# Patient Record
Sex: FEMALE | Race: WHITE
[De-identification: names, ages, dates, MRNs, and addresses within clinical notes are randomized per-mention and may not be internally consistent; named-entity substitution may affect disease eponyms.]

---

## 2018-02-04 ENCOUNTER — HOSPITAL ENCOUNTER (EMERGENCY)
Dept: HOSPITAL 63 - ER | Age: 68
Discharge: HOME | End: 2018-02-04
Payer: MEDICARE

## 2018-02-04 VITALS — WEIGHT: 190.26 LBS | HEIGHT: 63 IN | BODY MASS INDEX: 33.71 KG/M2

## 2018-02-04 VITALS — DIASTOLIC BLOOD PRESSURE: 85 MMHG | SYSTOLIC BLOOD PRESSURE: 149 MMHG

## 2018-02-04 DIAGNOSIS — I11.0: ICD-10-CM

## 2018-02-04 DIAGNOSIS — E11.9: ICD-10-CM

## 2018-02-04 DIAGNOSIS — Z88.8: ICD-10-CM

## 2018-02-04 DIAGNOSIS — I50.9: ICD-10-CM

## 2018-02-04 DIAGNOSIS — E78.00: ICD-10-CM

## 2018-02-04 DIAGNOSIS — E03.9: ICD-10-CM

## 2018-02-04 DIAGNOSIS — I49.9: Primary | ICD-10-CM

## 2018-02-04 LAB
ALBUMIN SERPL-MCNC: 3.7 G/DL (ref 3.4–5)
ALBUMIN/GLOB SERPL: 1.1 {RATIO} (ref 1–1.7)
ALP SERPL-CCNC: 93 U/L (ref 46–116)
ALT SERPL-CCNC: 21 U/L (ref 14–59)
ANION GAP SERPL CALC-SCNC: 11 MMOL/L (ref 6–14)
AST SERPL-CCNC: 18 U/L (ref 15–37)
BASOPHILS # BLD AUTO: 0 X10^3/UL (ref 0–0.2)
BASOPHILS NFR BLD: 1 % (ref 0–3)
BILIRUB SERPL-MCNC: 0.6 MG/DL (ref 0.2–1)
BUN/CREAT SERPL: 18 (ref 6–20)
CA-I SERPL ISE-MCNC: 18 MG/DL (ref 7–20)
CALCIUM SERPL-MCNC: 8.8 MG/DL (ref 8.5–10.1)
CHLORIDE SERPL-SCNC: 103 MMOL/L (ref 98–107)
CK SERPL-CCNC: 81 U/L (ref 26–192)
CO2 SERPL-SCNC: 25 MMOL/L (ref 21–32)
CREAT SERPL-MCNC: 1 MG/DL (ref 0.6–1)
EOSINOPHIL NFR BLD: 0.2 X10^3/UL (ref 0–0.7)
EOSINOPHIL NFR BLD: 3 % (ref 0–3)
ERYTHROCYTE [DISTWIDTH] IN BLOOD BY AUTOMATED COUNT: 13.6 % (ref 11.5–14.5)
GFR SERPLBLD BASED ON 1.73 SQ M-ARVRAT: 55.5 ML/MIN
GLOBULIN SER-MCNC: 3.5 G/DL (ref 2.2–3.8)
GLUCOSE SERPL-MCNC: 156 MG/DL (ref 70–99)
HCT VFR BLD CALC: 40.6 % (ref 36–47)
HGB BLD-MCNC: 14.1 G/DL (ref 12–15.5)
LYMPHOCYTES # BLD: 1.6 X10^3/UL (ref 1–4.8)
LYMPHOCYTES NFR BLD AUTO: 28 % (ref 24–48)
MAGNESIUM SERPL-MCNC: 2 MG/DL (ref 1.8–2.4)
MCH RBC QN AUTO: 31 PG (ref 25–35)
MCHC RBC AUTO-ENTMCNC: 35 G/DL (ref 31–37)
MCV RBC AUTO: 89 FL (ref 79–100)
MONO #: 0.6 X10^3/UL (ref 0–1.1)
MONOCYTES NFR BLD: 10 % (ref 0–9)
NEUT #: 3.4 X10^3UL (ref 1.8–7.7)
NEUTROPHILS NFR BLD AUTO: 58 % (ref 31–73)
PLATELET # BLD AUTO: 231 X10^3/UL (ref 140–400)
POTASSIUM SERPL-SCNC: 4 MMOL/L (ref 3.5–5.1)
PROT SERPL-MCNC: 7.2 G/DL (ref 6.4–8.2)
RBC # BLD AUTO: 4.57 X10^6/UL (ref 3.5–5.4)
SODIUM SERPL-SCNC: 139 MMOL/L (ref 136–145)
WBC # BLD AUTO: 5.8 X10^3/UL (ref 4–11)

## 2018-02-04 PROCEDURE — 93005 ELECTROCARDIOGRAM TRACING: CPT

## 2018-02-04 PROCEDURE — 83880 ASSAY OF NATRIURETIC PEPTIDE: CPT

## 2018-02-04 PROCEDURE — 84443 ASSAY THYROID STIM HORMONE: CPT

## 2018-02-04 PROCEDURE — 83735 ASSAY OF MAGNESIUM: CPT

## 2018-02-04 PROCEDURE — 85025 COMPLETE CBC W/AUTO DIFF WBC: CPT

## 2018-02-04 PROCEDURE — 80053 COMPREHEN METABOLIC PANEL: CPT

## 2018-02-04 PROCEDURE — 84484 ASSAY OF TROPONIN QUANT: CPT

## 2018-02-04 PROCEDURE — 71046 X-RAY EXAM CHEST 2 VIEWS: CPT

## 2018-02-04 PROCEDURE — 82553 CREATINE MB FRACTION: CPT

## 2018-02-04 PROCEDURE — 85610 PROTHROMBIN TIME: CPT

## 2018-02-04 PROCEDURE — 83605 ASSAY OF LACTIC ACID: CPT

## 2018-02-04 PROCEDURE — 36415 COLL VENOUS BLD VENIPUNCTURE: CPT

## 2018-02-04 NOTE — EKG
[facility] [address], [place] [address]

Test Date:    2018               Test Time:    06:31:18

Pat Name:     JAVIER SMITH          Department:   

Patient ID:   [id number]           Room:          

Gender:       F                        Technician:   JENIFFER

:          1950               Requested By: DANITZA CAROLINA

Order Number: [id number]            Reading MD:     

                                 Measurements

Intervals                              Axis          

Rate:         64                       P:            89

AK:           200                      QRS:          9

QRSD:         74                       T:            86

QT:           394                                    

QTc:          410                                    

                           Interpretive Statements

SINUS RHYTHM

VENTRICULAR PREMATURE COMPLEX(ES)

ST & T ABNORMALITY, CONSIDER

HIGH LATERAL ISCHEMIA OR LEFT VENTRICULAR STRAIN

ABNORMAL ECG

RI6.01

No previous ECG available for comparison

## 2018-02-04 NOTE — PHYS DOC
Past History


Past Medical History:  Arthritis, Diabetes, High Cholesterol, Hypertension, 

Hypothyroid


Past Surgical History:  No Surgical History


Alcohol Use:  None


Drug Use:  None





Adult General


Chief Complaint


Chief Complaint:  my heart stopped





[facility]


67-year-old female patient with history of diabetes, dyslipidemia, hypertension 

and hypothyroidism without cardiac problem states she woke up with unusual 

feeling in her heart and felt looked like her heart was stopped. Patient 

complaining of not feeling right at that time without weakness, chest pain, 

shortness of breath, palpitation. Patient states the episodes last about 10 

minutes and then she felt heart her heart beat for a short time. Patient states 

she had 2 other episodes of the same problem during the last 1 month without 

seeking medical attention.





Review of Systems


Review of Systems





Constitutional: Denies fever or chills []


Eyes: Denies change in visual acuity, redness, or eye pain []


HENT: Denies nasal congestion or sore throat []


Respiratory: Denies cough or shortness of breath []


Cardiovascular: No additional information not addressed in HPI []


GI: Denies abdominal pain, nausea, vomiting, bloody stools or diarrhea []


: Denies dysuria or hematuria []


Musculoskeletal: Denies back pain or joint pain []


Integument: Denies rash or skin lesions []


Neurologic: Denies headache, focal weakness or sensory changes []


Endocrine: Denies polyuria or polydipsia []





All other systems were reviewed and found to be within normal limits, except as 

documented in this note.





Allergies


Allergies





Allergies








Coded Allergies Type Severity Reaction Last Updated Verified


 


  lisinopril Allergy Intermediate  2/4/18 No











Physical Exam


Physical Exam





Constitutional: Well developed, well nourished, no acute distress, non-toxic 

appearance. []


HENT: Normocephalic, atraumatic, bilateral external ears normal, oropharynx 

moist, no oral exudates, nose normal. []


Eyes: PERRLA, EOMI, conjunctiva normal, no discharge. [] 


Neck: Normal range of motion, no tenderness, supple, no stridor. [] 


Cardiovascular:Heart rate regular rhythm, no murmur []


Lungs & Thorax:  Bilateral breath sounds clear to auscultation []


Abdomen: Bowel sounds normal, soft, no tenderness, no masses, no pulsatile 

masses. [] 


Skin: Warm, dry, no erythema, no rash. [] 


Back: No tenderness, no CVA tenderness. [] 


Extremities: No tenderness, no cyanosis, no clubbing, ROM intact, no edema. [] 


Neurologic: Alert and oriented X 3, normal motor function, normal sensory 

function, no focal deficits noted. []


Psychologic: Affect normal, judgement normal, mood normal. []





Current Patient Data


Vital Signs





 Vital Signs








  Date Time  Temp Pulse Resp B/P (MAP) Pulse Ox O2 Delivery O2 Flow Rate FiO2


 


2/4/18 06:30 97.6 55 22 176/93 (120) 96 Room Air  








Lab Results





 Laboratory Tests








Test


  2/4/18


06:35


 


White Blood Count


  5.8 x10^3/uL


(4.0-11.0)


 


Red Blood Count


  4.57 x10^6/uL


(3.50-5.40)


 


Hemoglobin


  14.1 g/dL


(12.0-15.5)


 


Hematocrit


  40.6 %


(36.0-47.0)


 


Mean Corpuscular Volume


  89 fL ()


 


 


Mean Corpuscular Hemoglobin 31 pg (25-35)  


 


Mean Corpuscular Hemoglobin


Concent 35 g/dL


(31-37)


 


Red Cell Distribution Width


  13.6 %


(11.5-14.5)


 


Platelet Count


  231 x10^3/uL


(140-400)


 


Neutrophils (%) (Auto) 58 % (31-73)  


 


Lymphocytes (%) (Auto) 28 % (24-48)  


 


Monocytes (%) (Auto) 10 % (0-9)  H


 


Eosinophils (%) (Auto) 3 % (0-3)  


 


Basophils (%) (Auto) 1 % (0-3)  


 


Neutrophils # (Auto)


  3.4 x10^3uL


(1.8-7.7)


 


Lymphocytes # (Auto)


  1.6 x10^3/uL


(1.0-4.8)


 


Monocytes # (Auto)


  0.6 x10^3/uL


(0.0-1.1)


 


Eosinophils # (Auto)


  0.2 x10^3/uL


(0.0-0.7)


 


Basophils # (Auto)


  0.0 x10^3/uL


(0.0-0.2)











EKG


EKG


EKG interpreted by me. EKG at 0 631 showed sinus rhythm with frequent PVCs, ST 

and T wave abnormality in lateral leads, no acute ST and T wave abnormality.





Radiology/Procedures


Radiology/Procedures


[Two-view chest x-ray interpreted by me did not show acute finding except for 

mild cardiomegaly]





Course & Med Decision Making


Course & Med Decision Making


Pertinent Labs and Imaging studies reviewed. (See chart for details)


Evaluation of patient in ER showed 67-year-old female patient with several 

episodes of unusual feeling in her cough for the last 1 month. Patient had one 

episodes of feeling of her heart was stopped this morning that last about 10 

minutes without loss of consciousness. Patient had frequent PVCs at arrival to 

ER that gradually resolved. Had unremarkable EKG and labs except for mild 

elevation of BNP. For patient admission for observation and continued on 

cardiac monitor. Patient refuses admission. Patient instructed to follow up 

with her primary care physician for possible Holter monitor. 


discharge:





I've spoken with the patient and/or caregivers. I've explained the patient's 

condition, diagnosis and treatment plan based on information available to me at 

this time. I've answered the patient's and/or caregivers questions and 

addressed any concerns. The patient and/or caregivers have a good understanding 

the patient's diagnosis, condition and treatment plan as can be expected at 

this point. Vital signs have been stabilized. The patient's condition is stable 

for discharge from the emergency department.





The patient will pursue further outpatient evaluation with her primary care 

provider or other designated consulting physician as outlined in the discharge 

instructions. Patient and/or caregivers are agreeable to this plan of care and 

follow-up instructions have been explained in detail. The patient and/or 

caregivers have received these instructions in written format and expressed 

understanding of these discharge instructions. The patient and her caregivers 

are aware that if any significant change in condition or worsening of symptoms 

should prompt him to immediately return to this of the closest emergency 

department.  If an emergent department is not readily available I would 

encourage him to call 911.





Dragon Disclaimer


Dragon Disclaimer


This electronic medical record was generated, in whole or in part, using a 

voice recognition dictation system.





Departure


Departure:


Impression:  


 Primary Impression:  


 Arrhythmia


 Additional Impression:  


 Heart failure


Disposition:  01 HOME, SELF-CARE (At 0834)


Condition:  IMPROVED


Referrals:  


BRITTANY WONG MD (PCP)


Patient Instructions:  Cardiac Arrhythmia





Additional Instructions:  


Follow-up with your primary care physician in one or 2 days for possible Holter 

cardiac monitor


Return if not getting better





Problem Qualifiers











DANITZA CAROLINA MD Feb 4, 2018 07:44

## 2018-02-04 NOTE — RAD
PROCEDURE: CHEST PA   LATERAL



CLINICAL INDICATION: irregular heartbeat



COMPARISON: 2/1/2016



FINDINGS:  

No pneumothorax identified. Cardiac and mediastinal contours unremarkable. No

pulmonary consolidation or acute airspace disease. No acute osseous

abnormalities identified. 



IMPRESSION:

No pulmonary consolidation or acute airspace disease.

## 2019-10-06 ENCOUNTER — HOSPITAL ENCOUNTER (OUTPATIENT)
Dept: HOSPITAL 63 - ER | Age: 69
Setting detail: OBSERVATION
Discharge: ANOTHER HOSPITAL | End: 2019-10-06
Attending: FAMILY MEDICINE | Admitting: FAMILY MEDICINE
Payer: MEDICARE

## 2019-10-06 ENCOUNTER — HOSPITAL ENCOUNTER (INPATIENT)
Dept: HOSPITAL 61 - 1 WEST ICU | Age: 69
LOS: 2 days | Discharge: HOME | DRG: 247 | End: 2019-10-08
Attending: INTERNAL MEDICINE | Admitting: INTERNAL MEDICINE
Payer: MEDICARE

## 2019-10-06 VITALS — DIASTOLIC BLOOD PRESSURE: 59 MMHG | SYSTOLIC BLOOD PRESSURE: 105 MMHG

## 2019-10-06 VITALS — DIASTOLIC BLOOD PRESSURE: 61 MMHG | SYSTOLIC BLOOD PRESSURE: 169 MMHG

## 2019-10-06 VITALS — HEIGHT: 63 IN | WEIGHT: 186.19 LBS | BODY MASS INDEX: 32.99 KG/M2

## 2019-10-06 VITALS — SYSTOLIC BLOOD PRESSURE: 153 MMHG | DIASTOLIC BLOOD PRESSURE: 67 MMHG

## 2019-10-06 VITALS — SYSTOLIC BLOOD PRESSURE: 159 MMHG | DIASTOLIC BLOOD PRESSURE: 72 MMHG

## 2019-10-06 VITALS — WEIGHT: 189.56 LBS | HEIGHT: 65 IN | BODY MASS INDEX: 31.58 KG/M2

## 2019-10-06 VITALS — DIASTOLIC BLOOD PRESSURE: 54 MMHG | SYSTOLIC BLOOD PRESSURE: 122 MMHG

## 2019-10-06 VITALS — SYSTOLIC BLOOD PRESSURE: 157 MMHG | DIASTOLIC BLOOD PRESSURE: 76 MMHG

## 2019-10-06 VITALS
DIASTOLIC BLOOD PRESSURE: 56 MMHG | SYSTOLIC BLOOD PRESSURE: 168 MMHG | SYSTOLIC BLOOD PRESSURE: 168 MMHG | DIASTOLIC BLOOD PRESSURE: 56 MMHG

## 2019-10-06 VITALS — DIASTOLIC BLOOD PRESSURE: 63 MMHG | SYSTOLIC BLOOD PRESSURE: 144 MMHG

## 2019-10-06 VITALS — DIASTOLIC BLOOD PRESSURE: 50 MMHG | SYSTOLIC BLOOD PRESSURE: 104 MMHG

## 2019-10-06 VITALS — SYSTOLIC BLOOD PRESSURE: 112 MMHG | DIASTOLIC BLOOD PRESSURE: 62 MMHG

## 2019-10-06 VITALS — SYSTOLIC BLOOD PRESSURE: 157 MMHG | DIASTOLIC BLOOD PRESSURE: 67 MMHG

## 2019-10-06 VITALS — DIASTOLIC BLOOD PRESSURE: 70 MMHG | SYSTOLIC BLOOD PRESSURE: 138 MMHG

## 2019-10-06 VITALS — SYSTOLIC BLOOD PRESSURE: 165 MMHG | DIASTOLIC BLOOD PRESSURE: 74 MMHG

## 2019-10-06 VITALS — DIASTOLIC BLOOD PRESSURE: 75 MMHG | SYSTOLIC BLOOD PRESSURE: 159 MMHG

## 2019-10-06 VITALS — DIASTOLIC BLOOD PRESSURE: 70 MMHG | SYSTOLIC BLOOD PRESSURE: 147 MMHG

## 2019-10-06 VITALS — DIASTOLIC BLOOD PRESSURE: 69 MMHG | SYSTOLIC BLOOD PRESSURE: 152 MMHG

## 2019-10-06 DIAGNOSIS — M19.90: ICD-10-CM

## 2019-10-06 DIAGNOSIS — R07.2: Primary | ICD-10-CM

## 2019-10-06 DIAGNOSIS — E66.9: ICD-10-CM

## 2019-10-06 DIAGNOSIS — E03.9: ICD-10-CM

## 2019-10-06 DIAGNOSIS — I21.4: Primary | ICD-10-CM

## 2019-10-06 DIAGNOSIS — Z82.49: ICD-10-CM

## 2019-10-06 DIAGNOSIS — N18.3: ICD-10-CM

## 2019-10-06 DIAGNOSIS — Z88.8: ICD-10-CM

## 2019-10-06 DIAGNOSIS — I12.9: ICD-10-CM

## 2019-10-06 DIAGNOSIS — I10: ICD-10-CM

## 2019-10-06 DIAGNOSIS — Z98.61: ICD-10-CM

## 2019-10-06 DIAGNOSIS — R79.89: ICD-10-CM

## 2019-10-06 DIAGNOSIS — E11.22: ICD-10-CM

## 2019-10-06 DIAGNOSIS — E78.5: ICD-10-CM

## 2019-10-06 DIAGNOSIS — E11.9: ICD-10-CM

## 2019-10-06 DIAGNOSIS — Z83.3: ICD-10-CM

## 2019-10-06 LAB
ALBUMIN SERPL-MCNC: 3.5 G/DL (ref 3.4–5)
ALBUMIN/GLOB SERPL: 0.9 {RATIO} (ref 1–1.7)
ALP SERPL-CCNC: 87 U/L (ref 46–116)
ALT SERPL-CCNC: 16 U/L (ref 14–59)
ANION GAP SERPL CALC-SCNC: 12 MMOL/L (ref 6–14)
AST SERPL-CCNC: 17 U/L (ref 15–37)
BASOPHILS # BLD AUTO: 0 X10^3/UL (ref 0–0.2)
BASOPHILS NFR BLD: 1 % (ref 0–3)
BILIRUB SERPL-MCNC: 0.4 MG/DL (ref 0.2–1)
BUN/CREAT SERPL: 19 (ref 6–20)
CA-I SERPL ISE-MCNC: 21 MG/DL (ref 7–20)
CALCIUM SERPL-MCNC: 8.7 MG/DL (ref 8.5–10.1)
CHLORIDE SERPL-SCNC: 100 MMOL/L (ref 98–107)
CO2 SERPL-SCNC: 26 MMOL/L (ref 21–32)
CREAT SERPL-MCNC: 1.1 MG/DL (ref 0.6–1)
EOSINOPHIL NFR BLD: 0.2 X10^3/UL (ref 0–0.7)
EOSINOPHIL NFR BLD: 2 % (ref 0–3)
ERYTHROCYTE [DISTWIDTH] IN BLOOD BY AUTOMATED COUNT: 13.9 % (ref 11.5–14.5)
GFR SERPLBLD BASED ON 1.73 SQ M-ARVRAT: 49.4 ML/MIN
GLOBULIN SER-MCNC: 3.7 G/DL (ref 2.2–3.8)
GLUCOSE SERPL-MCNC: 164 MG/DL (ref 70–99)
HCT VFR BLD CALC: 38.4 % (ref 36–47)
HGB BLD-MCNC: 13.3 G/DL (ref 12–15.5)
LYMPHOCYTES # BLD: 1.7 X10^3/UL (ref 1–4.8)
LYMPHOCYTES NFR BLD AUTO: 24 % (ref 24–48)
MCH RBC QN AUTO: 31 PG (ref 25–35)
MCHC RBC AUTO-ENTMCNC: 35 G/DL (ref 31–37)
MCV RBC AUTO: 89 FL (ref 79–100)
MONO #: 0.8 X10^3/UL (ref 0–1.1)
MONOCYTES NFR BLD: 11 % (ref 0–9)
NEUT #: 4.3 X10^3UL (ref 1.8–7.7)
NEUTROPHILS NFR BLD AUTO: 62 % (ref 31–73)
PLATELET # BLD AUTO: 240 X10^3/UL (ref 140–400)
POTASSIUM SERPL-SCNC: 3.5 MMOL/L (ref 3.5–5.1)
PROT SERPL-MCNC: 7.2 G/DL (ref 6.4–8.2)
RBC # BLD AUTO: 4.31 X10^6/UL (ref 3.5–5.4)
SODIUM SERPL-SCNC: 138 MMOL/L (ref 136–145)
WBC # BLD AUTO: 7 X10^3/UL (ref 4–11)

## 2019-10-06 PROCEDURE — 82962 GLUCOSE BLOOD TEST: CPT

## 2019-10-06 PROCEDURE — 85730 THROMBOPLASTIN TIME PARTIAL: CPT

## 2019-10-06 PROCEDURE — 85027 COMPLETE CBC AUTOMATED: CPT

## 2019-10-06 PROCEDURE — 85610 PROTHROMBIN TIME: CPT

## 2019-10-06 PROCEDURE — 83735 ASSAY OF MAGNESIUM: CPT

## 2019-10-06 PROCEDURE — G0378 HOSPITAL OBSERVATION PER HR: HCPCS

## 2019-10-06 PROCEDURE — 93005 ELECTROCARDIOGRAM TRACING: CPT

## 2019-10-06 PROCEDURE — C1892 INTRO/SHEATH,FIXED,PEEL-AWAY: HCPCS

## 2019-10-06 PROCEDURE — 71045 X-RAY EXAM CHEST 1 VIEW: CPT

## 2019-10-06 PROCEDURE — 80053 COMPREHEN METABOLIC PANEL: CPT

## 2019-10-06 PROCEDURE — 85379 FIBRIN DEGRADATION QUANT: CPT

## 2019-10-06 PROCEDURE — 96375 TX/PRO/DX INJ NEW DRUG ADDON: CPT

## 2019-10-06 PROCEDURE — 92928 PRQ TCAT PLMT NTRAC ST 1 LES: CPT

## 2019-10-06 PROCEDURE — 83036 HEMOGLOBIN GLYCOSYLATED A1C: CPT

## 2019-10-06 PROCEDURE — C1713 ANCHOR/SCREW BN/BN,TIS/BN: HCPCS

## 2019-10-06 PROCEDURE — 84484 ASSAY OF TROPONIN QUANT: CPT

## 2019-10-06 PROCEDURE — G0379 DIRECT REFER HOSPITAL OBSERV: HCPCS

## 2019-10-06 PROCEDURE — 80048 BASIC METABOLIC PNL TOTAL CA: CPT

## 2019-10-06 PROCEDURE — 93458 L HRT ARTERY/VENTRICLE ANGIO: CPT

## 2019-10-06 PROCEDURE — 93306 TTE W/DOPPLER COMPLETE: CPT

## 2019-10-06 PROCEDURE — 99152 MOD SED SAME PHYS/QHP 5/>YRS: CPT

## 2019-10-06 PROCEDURE — 85520 HEPARIN ASSAY: CPT

## 2019-10-06 PROCEDURE — 80061 LIPID PANEL: CPT

## 2019-10-06 PROCEDURE — 84443 ASSAY THYROID STIM HORMONE: CPT

## 2019-10-06 PROCEDURE — C1769 GUIDE WIRE: HCPCS

## 2019-10-06 PROCEDURE — C1725 CATH, TRANSLUMIN NON-LASER: HCPCS

## 2019-10-06 PROCEDURE — 99153 MOD SED SAME PHYS/QHP EA: CPT

## 2019-10-06 PROCEDURE — 85025 COMPLETE CBC W/AUTO DIFF WBC: CPT

## 2019-10-06 PROCEDURE — 36415 COLL VENOUS BLD VENIPUNCTURE: CPT

## 2019-10-06 PROCEDURE — 99284 EMERGENCY DEPT VISIT MOD MDM: CPT

## 2019-10-06 PROCEDURE — 96374 THER/PROPH/DIAG INJ IV PUSH: CPT

## 2019-10-06 RX ADMIN — HEPARIN SODIUM AND DEXTROSE PRN MLS/HR: 5000; 5 INJECTION INTRAVENOUS at 13:30

## 2019-10-06 RX ADMIN — INSULIN LISPRO SCH UNITS: 100 INJECTION, SOLUTION INTRAVENOUS; SUBCUTANEOUS at 21:00

## 2019-10-06 RX ADMIN — MORPHINE SULFATE PRN MG: 2 INJECTION, SOLUTION INTRAMUSCULAR; INTRAVENOUS at 17:08

## 2019-10-06 RX ADMIN — INSULIN GLARGINE SCH UNIT: 100 INJECTION, SOLUTION SUBCUTANEOUS at 21:36

## 2019-10-06 RX ADMIN — LOSARTAN POTASSIUM SCH MG: 50 TABLET ORAL at 15:34

## 2019-10-06 RX ADMIN — INSULIN LISPRO SCH UNITS: 100 INJECTION, SOLUTION INTRAVENOUS; SUBCUTANEOUS at 17:05

## 2019-10-06 RX ADMIN — BACITRACIN SCH MLS/HR: 5000 INJECTION, POWDER, FOR SOLUTION INTRAMUSCULAR at 21:22

## 2019-10-06 NOTE — EKG
[facility]

              [address] [place] [address]

Test Date:    2019-10-06               Test Time:    13:31:46

Pat Name:     JAVIER SMITH          Department:   

Patient ID:   [id number]           Room:         112 1

Gender:       F                        Technician:   MARQUITAS

:          1950               Requested By: GARY KEATING

Order Number: [id number]           Reading MD:   Harinder Mckenzie MD

                                 Measurements

Intervals                              Axis          

Rate:         48                       P:            66

[place]:           174                      QRS:          23

QRSD:         80                       T:            81

QT:           474                                    

QTc:          423                                    

                           Interpretive Statements

SINUS BRADYCARDIA



Electronically Signed On 10- 9:59:26 CDT by Harinder Mckenzie MD

## 2019-10-06 NOTE — HP
ADMIT DATE:  10/06/2019



HISTORY OF PRESENT ILLNESS:  A 69-year-old female with history of diabetes.  The

patient apparently this morning started to develop substernal chest pain,

feeling like something was going heaviness through her chest and as a result of

that, the patient noted this pain woke her up.  Pain radiated down her left arm

and up into her neck.  She said the pain was approximately 7/10.  She has some

shortness of breath, but denied nausea, vomiting, or diaphoresis.  In any case,

the patient came in through the Emergency Room.  She was admitted here to the

floor for observation as her initial cardiac enzymes and EKGs were unremarkable.

 The patient, however, continued to have recurrent chest pain on the floor and

was given additional nitroglycerin and repeat EKG and repeat troponins and the

latter increased to greater than 1.  As a result of this, a call was placed to

Dr. Mcgrath, cardiologist, who agreed to accept her in transfer to [place],

placed her on a heparin drip, nitro paste and she will be transferred to the ICU

down there at [place] for further evaluation by Dr. Mcgrath.



PAST MEDICAL HISTORY:  Includes that of angina, hypertension, arthritis,

diabetes, hypothyroidism.



FAMILY HISTORY:  Unremarkable.



ALLERGIES:  LISINOPRIL.



HOME MEDICATIONS:  Include that of losartan 50 mg daily, meloxicam 15 mg daily,

hydrochlorothiazide daily, metformin 500 mg before breakfast and levothyroxine

25 mcg daily.



SOCIAL HISTORY:  The patient denies smoking, alcohol or drug use.  She is a full

code.



REVIEW OF SYSTEMS:  The patient denies headache.  Does have some mild shortness

of breath.  Denies any diaphoresis or nausea, vomiting.  Does have the chest

pain as described, dull achy sensation in substernal chest area, radiating down

the left arm.  The patient denies any abdominal pain, nausea, vomiting, melena,

hematochezia, hematemesis and neurologically she has had no changes in her

neural system.



PHYSICAL EXAMINATION:

GENERAL:  This is a pleasant white female, in no apparent distress at the

present time.

VITAL SIGNS:  Blood pressure 157/67, respiratory rate 20, pulse 45, afebrile, 

body mass index of 31.5.  Weight 85.98 kilos, height 165 cm.  The patient is

alert and oriented.

HEENT:  Head atraumatic, normocephalic.  Eyes:  PERRLA without jaundice.  The

mouth and throat were normal.

NECK:  Supple, no JVD or thyromegaly.

LUNGS:  Diminished throughout, poor movement of air, but clear throughout.

CARDIOVASCULAR:  Regular sinus rhythm, occasional dropped beat.

ABDOMEN:  Soft, nontender, no rebound or guarding.  Positive bowel sounds, no

hepatosplenomegaly.

EXTREMITIES:  No clubbing, cyanosis, nor edema.  Pulses noted distally 2/4.

NEUROLOGIC:  The patient is alert and oriented.  Speech is fluent, spontaneous,

appropriate.  Cranial nerves 2-12 grossly intact.  Moving all extremities well. 

Does have some tingling down the left arm as noted.



LABORATORY DATA:  The patient's labs as indicated sodium and potassium 138 and

3.5, BUN and creatinine 21 and 1.1, GFR 49, blood sugar of 164.  Last troponin

1.020, elevated.  Albumin is slightly low.  The patient's CBC:  White count 7,

hemoglobin 13 and hematocrit 38.  Chest x-ray was unremarkable and clear except

for possible mild bronchitis, started on doxycycline for this.



IMPRESSION:  Therefore, possible angina, chest pain, essential hypertension,

bradycardia, elevated troponin levels, type 2 diabetes, obesity, chronic kidney

disease stage 3.



PLAN:  The patient will be transferred down to [place] in care of Dr. Mcgrath, Cardiology, make further evaluation as he sees appropriate.  She will

be transferred via EMS.  Transfer orders have been related to the ICU doctors at

[place].





______________________________

CASSIDY CAR MD



DR:  LEISA/edil  JOB#:  [id number] / [id number]

DD:  10/06/2019 11:33  DT:  10/06/2019 11:50

## 2019-10-06 NOTE — EKG
[facility] [address], [place] [address]

Test Date:    2019-10-06               Test Time:    10:19:47

Pat Name:     JAVIER SMITH          Department:   

Patient ID:   [id number]           Room:         115 A

Gender:       F                        Technician:   

:          1950               Requested By: CASSIDY CAR

Order Number: [id number]            Reading MD:     

                                 Measurements

Intervals                              Axis          

Rate:         45                       P:            90

CO:           182                      QRS:          13

QRSD:         74                       T:            56

QT:           470                                    

QTc:          409                                    

                           Interpretive Statements

SINUS BRADYCARDIA

T ABNORMALITY IN HIGH LATERAL LEADS

ABNORMAL ECG

RI6.02

No previous ECG available for comparison

## 2019-10-06 NOTE — EKG
[facility] [address], [place] [address]

Test Date:    2019-10-06               Test Time:    05:34:06

Pat Name:     JAVIER SMITH          Department:   

Patient ID:   [id number]           Room:          

Gender:       F                        Technician:   

:          [id number]               Requested By: CRIS SHARPE

Order Number: [id number]            Reading MD:     

                                 Measurements

Intervals                              Axis          

Rate:         68                       P:            68

WA:           186                      QRS:          -2

QRSD:         82                       T:            25

QT:           510                                    

QTc:          543                                    

                           Interpretive Statements

SINUS RHYTHM

VENTRICULAR PREMATURE COMPLEX(ES)

ATRIAL PREMATURE COMPLEX(ES)

LEFTWARD AXIS

PROLONGED QT

ABNORMAL ECG

RI6.01

No previous ECG available for comparison

## 2019-10-06 NOTE — RAD
Indication:Chest pain, cough.

 

TECHNIQUE:Portable AP chest X-ray

 

COMPARISON: None

 

FINDINGS: Heart is normal in size. Mild prominence of bronchial markings. 

No focal consolidation. No pneumothorax or pleural effusion. Visualized 

bony thorax within normal limits.

 

IMPRESSION: Suggestion of mild bronchitis/atypical/viral infection.

 

Electronically signed by: Jose Ramon Hernandez DO (10/6/2019 7:49 AM) [facility]

## 2019-10-06 NOTE — PDOC2
CONSULT


Date of Consult


Date of Consult


DATE: 10/6/19 


TIME: 18:15





Reason for Consult


Reason for Consult:


NSTEMI





Referring Physician


Referring Physician:


Dr. Mojica





Identification/Chief Complaint


Chief Complaint


chest pain





Source


Source:  Chart review, Patient





History of Present Illness


Reason for Visit:


The patient is a 69-year-old female who was admitted earlier today through the 

emergency room at [facility] for episodes of chest discomfort. Patient's initial

troponin was normal. EKG showed no acute ischemic changes. Patient was admitted 

and her second troponin was elevated at 1.02. Secondary to this she was 

transferred to [facility] for further treatment and evaluation. Patient

was placed on a heparin drip. On arrival the patient is feeling better. Her 

chest pain has resolved. She has a history of chronic kidney disease, 

hypertension, diabetes mellitus and uncertain cholesterol level. Additionally 

she has a strong family history of coronary artery disease. EKG shows no acute 

ischemic changes but has a heart rate of approximately 50. Patient has not 

treated with beta blockers.





Past Medical History


Cardiovascular:  HTN


Pulmonary:  No pertinent hx


GI:  No pertinent hx


Heme/Onc:  No pertinent hx


Hepatobiliary:  No pertinent hx


Psych:  No pertinent hx


Musculoskeletal:  Osteoarthritis


Rheumatologic:  No pertinent hx


Infectious disease:  No pertinent hx


ENT:  No pertinent hx


Renal/:  No pertinent hx


Endocrine:  Diabetes, Hypothyroidism


Dermatology:  No pertinent hx





Past Surgical History


Past Surgical History:  No pertinent history





Family History


Family History:  Coronary Artery Disease (Father - CABG, Brother - CAD), 

Diabetes, Heart Disease, High Cholestrol, Hypertension





Social History


No


ALCOHOL:  none


Drugs:  None





Current Medications


Current Medications





Current Medications


Heparin Sodium/ Dextrose 500 ml @  19.8 mls/hr CONT  PRN IV SEE I/O RECORD Last 

administered on 10/6/19at 13:30;  Start 10/6/19 at 13:00


Heparin Sodium (Porcine) (Heparin Sodium) 2,050 unit PRN Q6HRS  PRN IV FOR UFH 

LEVEL LESS THAN 0.2;  Start 10/6/19 at 13:00


Morphine Sulfate (Morphine Sulfate) 2 mg PRN Q2HR  PRN IV SEVERE PAIN Last 

administered on 10/6/19at 17:08;  Start 10/6/19 at 13:45


Morphine Sulfate (Morphine Sulfate) 1 mg PRN Q2HR  PRN IV MODERATE PAIN Last 

administered on 10/6/19at 13:50;  Start 10/6/19 at 13:45


Ondansetron HCl (Zofran) 4 mg PRN Q4HRS  PRN IV NAUSEA/VOMITING;  Start 10/6/19 

at 14:30


Acetaminophen (Tylenol) 650 mg PRN Q4HRS  PRN PO TEMP OVER 100.4F OR MILD PAIN; 

Start 10/6/19 at 14:30


Insulin Glargine (Lantus Syringe) 8 unit QHS SQ ;  Start 10/6/19 at 21:00


Insulin Human Lispro (HumaLOG) 0-5 UNITS TIDACHC SQ  Last administered on 

10/6/19at 17:05;  Start 10/6/19 at 16:30


Dextrose (Dextrose 50%-Water Syringe) 12.5 gm PRN Q15MIN  PRN IV SEE COMMENTS;  

Start 10/6/19 at 14:30


Levothyroxine Sodium (Synthroid) 25 mcg DAILY06 PO ;  Start 10/7/19 at 06:00


Losartan Potassium (Cozaar) 50 mg DAILY PO  Last administered on 10/6/19at 

15:34;  Start 10/6/19 at 15:00


Hydrochlorothiazide (Microzide) 12.5 mg DAILY PO  Last administered on 10/6/19at

15:34;  Start 10/6/19 at 15:00





Active Scripts


Active


Reported


Metformin Hcl Er (Metformin Hcl) 500 Mg Tab.er.24h 500 Mg PO DAILYWBKFT


Hydrochlorothiazide Tablet (Hydrochlorothiazide) 12.5 Mg Tablet 12.5 Mg PO DAILY


Meloxicam 15 Mg Tablet 1 Tab PO DAILY


Losartan-Hctz 50-12.5 Mg Tab (Losartan/Hydrochlorothiazide) 1 Each Tablet 1 Tab 

PO DAILY


Levothyroxine Sodium 25 Mcg Tablet 1 Tab PO DAILY





Allergies


Allergies:  


Coded Allergies:  


     lisinopril (Verified  Allergy, Intermediate, Unknown, 10/6/19)





ROS


Cardiovascular:  yes Chest Pain





Physical Exam


General:  No acute distress


HEENT:  Atraumatic


Lungs:  Clear to auscultation


Heart:  Regular rate


Abdomen:  Normal bowel sounds





Vitals


VITALS





Vital Signs








  Date Time  Temp Pulse Resp B/P (MAP) Pulse Ox O2 Delivery O2 Flow Rate FiO2


 


10/6/19 18:10      Room Air  


 


10/6/19 18:00  46 16 122/54 (76) 98   


 


10/6/19 16:00 97.9       





 97.9       











Labs


Labs





Laboratory Tests








Test


 10/6/19


17:02


 


Glucose (Fingerstick)


 216 mg/dL


(70-99)








Laboratory Tests








Test


 10/6/19


17:02


 


Glucose (Fingerstick)


 216 mg/dL


(70-99)











Images


Images


Chest x-ray pending





Assessment/Plan


Assessment/Plan


1. Non-ST elevated myocardial infarction. Chest pain as above. Minimal elevation

of troponin. Pain-free on heparin. Multiple risk factors for coronary disease. 

We'll continue present medications including IV heparin. Monitor overnight. 

Expect cardiac catheterization tomorrow. Risks and benefits were discussed with 

the patient.





2. Mild sinus bradycardia. Not on beta blockers. We'll continue to monitor.





3. Diabetes mellitus. As per the primary service.





4. Chronic kidney disease. We'll recheck lab. We'll treat with IV fluids prior 

to contrast.





5. Uncertain cholesterol level. We'll check morning lab.





Thank you for allowing us to participate in the care of your pleasant patient.











GARY KEATING MD             Oct 6, 2019 18:20

## 2019-10-06 NOTE — PDOC1
History and Physical


Date of Admission


Date of Admission


DATE: 10/6/19 


TIME: 16:32





Identification/Chief Complaint


Chief Complaint


Chest pain





Source


Source:  Patient





History of Present Illness


History of Present Illness


Ms Capone is a 69-year-old female with history of HTN, OA, hypothyroidism, 

diabetes who presents to [facility] ED this morning with chest pain. This 

morning started to develop substernal chest pain that is a heaviness that woke 

her up.  Pain radiated down her left arm and up into her neck.  She said the 

pain was approximately 7/10.  She has some shortness of breath, but denied 

nausea, vomiting, or diaphoresis.


She was initially observed with negative troponin and EKG was sinus bradycardia 

with t abnormality in high lateral leads, but no ST segment changes or TWI. 

Initial troponin was negative and she was being observed inpatient at [facility] 

until her second troponin returned 1.020 at which point she was given additional

nitroglycerin and repeat EKG. A call was placed to myself and Dr. Mcgrath, 

cardiologist, who agreed to accept her in transfer to [place] on a heparin 

drip.





Or, placed her on a heparin drip, nitro paste and she will be transferred to the

ICU


down there at [place] for further evaluation by Dr. Mcgrath.





IMPRESSION:  Therefore, possible angina, chest pain, essential hypertension,


bradycardia, elevated troponin levels, type 2 diabetes, obesity, chronic kidney


disease stage 3.





PLAN:  The patient will be transferred down to [place] in care of Dr. Mcgrath, Cardiology, make further evaluation as he sees appropriate.  She will


be transferred via EMS.  Transfer orders have been related to the ICU doctors at


[place].





Past Medical History


Cardiovascular:  HTN


Pulmonary:  No pertinent hx


GI:  No pertinent hx


Heme/Onc:  No pertinent hx


Hepatobiliary:  No pertinent hx


Psych:  No pertinent hx


Musculoskeletal:  Osteoarthritis


Rheumatologic:  No pertinent hx


Infectious disease:  No pertinent hx


ENT:  No pertinent hx


Renal/:  No pertinent hx


Endocrine:  Diabetes, Hypothyroidism


Dermatology:  No pertinent hx





Past Surgical History


Past Surgical History:  No pertinent history





Family History


Family History:  Coronary Artery Disease (Father - CABG, Brother - CAD), 

Diabetes, Heart Disease, High Cholestrol, Hypertension





Social History


Smoke:  No


ALCOHOL:  none


Drugs:  None





Current Medications


Current Medications





Current Medications


Heparin Sodium/ Dextrose 500 ml @  19.8 mls/hr CONT  PRN IV SEE I/O RECORD Last 

administered on 10/6/19at 13:30;  Start 10/6/19 at 13:00


Heparin Sodium (Porcine) (Heparin Sodium) 2,050 unit PRN Q6HRS  PRN IV FOR UFH 

LEVEL LESS THAN 0.2;  Start 10/6/19 at 13:00


Morphine Sulfate (Morphine Sulfate) 2 mg PRN Q2HR  PRN IV SEVERE PAIN;  Start 

10/6/19 at 13:45


Morphine Sulfate (Morphine Sulfate) 1 mg PRN Q2HR  PRN IV MODERATE PAIN Last 

administered on 10/6/19at 13:50;  Start 10/6/19 at 13:45


Ondansetron HCl (Zofran) 4 mg PRN Q4HRS  PRN IV NAUSEA/VOMITING;  Start 10/6/19 

at 14:30


Acetaminophen (Tylenol) 650 mg PRN Q4HRS  PRN PO TEMP OVER 100.4F OR MILD PAIN; 

Start 10/6/19 at 14:30


Insulin Glargine (Lantus Syringe) 8 unit QHS SQ ;  Start 10/6/19 at 21:00


Insulin Human Lispro (HumaLOG) 0-5 UNITS TIDACHC SQ ;  Start 10/6/19 at 16:30


Dextrose (Dextrose 50%-Water Syringe) 12.5 gm PRN Q15MIN  PRN IV SEE COMMENTS;  

Start 10/6/19 at 14:30


Levothyroxine Sodium (Synthroid) 25 mcg DAILY06 PO ;  Start 10/7/19 at 06:00


Losartan Potassium (Cozaar) 50 mg DAILY PO  Last administered on 10/6/19at 

15:34;  Start 10/6/19 at 15:00


Hydrochlorothiazide (Microzide) 12.5 mg DAILY PO  Last administered on 10/6/19at

15:34;  Start 10/6/19 at 15:00





Active Scripts


Active


Reported


Metformin Hcl Er (Metformin Hcl) 500 Mg Tab.er.24h 500 Mg PO DAILYWBKFT


Hydrochlorothiazide Tablet (Hydrochlorothiazide) 12.5 Mg Tablet 12.5 Mg PO DAILY


Meloxicam 15 Mg Tablet 1 Tab PO DAILY


Losartan-Hctz 50-12.5 Mg Tab (Losartan/Hydrochlorothiazide) 1 Each Tablet 1 Tab 

PO DAILY


Levothyroxine Sodium 25 Mcg Tablet 1 Tab PO DAILY





Allergies


Allergies:  


Coded Allergies:  


     lisinopril (Verified  Allergy, Intermediate, Unknown, 10/6/19)





ROS


General:  YES: Fatigue, Malaise; 


   No: Chills, Night Sweats, Appetite, Other


PSYCHOLOGICAL ROS:  No: Anxiety, Behavioral Disorder, Concentration difficultie,

Decreased libido, Depression, Disorientation, Hallucinations, Hostility, 

Irritablity, Memory difficulties, Mood Swings, Obsessive thoughts, Physical 

abuse, Sexual abuse, Sleep disturbances, Suicidal ideation, Other


Eyes:  No Blurry vision, No Decreased vision, No Double vision, No Dry eyes, No 

Excessive tearing, No Eye Pain, No Itchy Eyes, No Loss of vision, No 

Photophobia, No Scotomata, No Uses contacts, No Uses glasses, No Other


HEENT:  No: Heacaches, Visual Changes, Hearing change, Nasal congestion, Nasal 

discharge, Oral lesions, Sinus pain, Sore Throat, Epistaxis, Sneezing, Snoring, 

Tinnitus, Vertigo, Vocal changes, Other


ALLERGY AND IMMUNOLOGY:  No: Hives, Insect Bite Sensitivity, Itchy/Watery Eyes, 

Nasal Congestion, Post Nasal Drip, Seasonal Allergies, Other


Hematological and Lymphatic:  No: Bleeding Problems, Blood Clots, Blood 

Transfusions, Brusing, Night Sweats, Pallor, Swollen Lymph Nodes, Other


ENDOCRINE:  No: Breast Changes, Galactorrhea, Hair Pattern Changes, Hot Flashes,

Malaise/lethargy, Mood Swings, Palpitations, Polydipsia/polyuria, Skin Changes, 

Temperature Intolerance, Unexpected Weight Changes, Other


Breast:  No New/Changing Breast Lumps, No Nipple changes, No Nipple discharge, N

o Other


Respiratory:  No: Cough, Hemoptysis, Orthopnea, Pleuritic Pain, Shortness of 

breath, SOB with excertion, Sputum Changes, Stridor, Tachypnea, Wheezing, Other


Cardiovascular:  yes Chest Pain; 


   No Palpitations, No Orthopnea, No Paroxysmal Noc. Dyspnea, No Edema, No Lt 

Headedness, No Other


Gastrointestinal:  Yes Nausea; 


   No Vomiting, No Abdominal Pain, No Diarrhea, No Constipation, No Melena, No 

Hematochezia, No Other


Genitourinary:  No Dysuria, No Frequency, No Incontinence, No Hematuria, No 

Retention, No Discharge, No Urgency, No Pain, No Flank Pain, No Other, No , No ,

No , No , No , No , No 


Musculoskeletal:  No Gait Disturbance, No Joint Pain, No Joint Stiffness, No 

Joint Swelling, No Muscle Pain, No Muscular Weakness, No Pain In:, No Swelling 

In:, No Other


Neurological:  No Behavorial Changes, No Bowel/Bladder ControlChng, No Confus

ion, No Dizziness, No Gait Disturbance, No Headaches, No Impaired Coord/balance,

No Memory Loss, No Numbness/Tingling, No Seizures, No Speech Problems, No 

Tremors, No Visual Changes, No Weakness, No Other


Skin:  No Dry Skin, No Eczema, No Hair Changes, No Lumps, No Mole Changes, No 

Mottling, No Nail Changes, No Pruritus, No Rash, No Skin Lesion Changes, No Othe

r, No Acne





Physical Exam


General:  Alert, Oriented X3, Cooperative, No acute distress


HEENT:  Atraumatic, PERRLA, EOMI, Mucous membr. moist/pink


Lungs:  Clear to auscultation, Normal air movement


Heart:  S1S2, RRR, no gallops, no murmurs


Abdomen:  Normal bowel sounds, Soft, No tenderness, No hepatosplenomegaly, No 

masses


Rectal Exam:  not examined


Extremities:  No clubbing, No cyanosis, No edema, Normal pulses, No 

tenderness/swelling


Skin:  No rashes, No breakdown, No significant lesion


Neuro:  Normal gait, Normal speech, Strength at 5/5 X4 ext, Normal tone, Se

nsation intact, Cranial nerves 3-12 NL, Reflexes 2+


Psych/Mental Status:  Mental status NL, Mood NL





Vitals


Vitals





Vital Signs








  Date Time  Temp Pulse Resp B/P (MAP) Pulse Ox O2 Delivery O2 Flow Rate FiO2


 


10/6/19 15:34  49  153/67    


 


10/6/19 15:00   16  99 Room Air  


 


10/6/19 12:20 98.0       





 98.0       











Images


Images


CXR - Possible bronchitis





VTE Prophylaxis Ordered


VTE Prophylaxis Devices:  No


VTE Pharmacological Prophylaxi:  Yes





Assessment/Plan


Assessment/Plan


A/P:


NSTEMI - with elevated troponin and classic cardiac symptoms, negative EKG. 

Heparin GTT, NTG, ASA, cardiology consulted, likely to cath lab in the near 

future


Sinus Bradycardia - not on BB or CCB. will monitor. May be symptom of MI


HTN - cont home meds


OA - hold mobic. ASA, tylenol for now


Hypothyroidism - cont levothyroxine. Check TSH with her bradycardia


Diabetes - sliding scale basal bolus plus. Hold metformin will likely receive 

contrast dye in the next day. Check A1c


Abnormal CXR - no respiratory abnormalities. Will monitor





FEN - Cardiac diet, NPO after midnight


PPX - Heparin GTT


FULL CODE


Dispo - inpatient for NSTEMI to [facility]. Likely 2 midnights.











GABY MCGILL MD         Oct 6, 2019 16:39

## 2019-10-06 NOTE — PHYS DOC
Adult General


Chief Complaint


Chief Complaint:  CHEST PAIN





HPI


HPI


69-year-old female presents with chest pain. Patient woke about 40 minutes prior

to arrival with a central chest pain that radiates into her left arm and up her 

neck. She describes the pain as sharp. It is a 7 out of 10. She has not had pain

like this before. She does have mild shortness of breath but denies diaphoresis.

She is very active. 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 She has

not been feeling abnormal prior to early this morning. No recent medication 

changes. No history of heart problems. She had a stress test several years ago. 

She denies fever or chills.





Review of Systems


Review of Systems





Constitutional: Denies fever or chills []


Eyes: Denies change in visual acuity, redness, or eye pain []


HENT: Denies nasal congestion or sore throat []


Respiratory: Denies cough or shortness of breath []


Cardiovascular: No additional information not addressed in HPI []


GI: Denies abdominal pain, nausea, vomiting, bloody stools or diarrhea []


: Denies dysuria or hematuria []


Musculoskeletal: Denies back pain or joint pain []


Integument: Denies rash or skin lesions []


Neurologic: Denies headache, focal weakness or sensory changes []


Endocrine: Denies polyuria or polydipsia []





All other systems were reviewed and found to be within normal limits, except as 

documented in this note.





Current Medications


Current Medications





Current Medications








 Medications


  (Trade)  Dose


 Ordered  Sig/Anabel  Start Time


 Stop Time Status Last Admin


Dose Admin


 


 Aspirin


  ([facility]S


 Aspirin)  324 mg  1X  ONCE  10/6/19 05:45


 10/6/19 05:46 UNV 10/6/19 05:44


324 MG











Physical Exam


Physical Exam





Constitutional: Well developed, well nourished, no acute distress, non-toxic 

appearance. []


HENT: Normocephalic, atraumatic, bilateral external ears normal, oropharynx 

moist, no oral exudates, nose normal. []


Eyes: PERRLA, EOMI, conjunctiva normal, no discharge. [] 


Neck: Normal range of motion, no tenderness, supple, no stridor. [] 


Cardiovascular:Heart rate regular rhythm, no murmur []


Lungs & Thorax:  Bilateral breath sounds clear to auscultation []


Abdomen: Bowel sounds normal, soft, no tenderness, no masses, no pulsatile 

masses. [] 


Skin: Warm, dry, no erythema, no rash. [] 


Back: No tenderness, no CVA tenderness. [] 


Extremities: No tenderness, no cyanosis, no clubbing, ROM intact, no edema. [] 


Neurologic: Alert and oriented X 3, normal motor function, normal sensory 

function, no focal deficits noted. []


Psychologic: Affect normal, judgement normal, mood normal. []





EKG


EKG


Sinus rhythm, rate 68, normal axis, no ST elevations or depressions, PACs, 

incomplete right bundle branch block.[]





Radiology/Procedures


Radiology/Procedures


[]





Course & Med Decision Making


Course & Med Decision Making


Pertinent Labs and Imaging studies reviewed. (See chart for details)


The patient was given 324 aspirin. The patient's workup is pending. I am signing

 the patient out to Dr. Singh at 0600 for further management and final 

disposition.


[]





Dragon Disclaimer


Dragon Disclaimer


This electronic medical record was generated, in whole or in part, using a voice

 recognition dictation system.





Departure


Departure:


Disposition:  01 HOME/RESIDENCE PRIOR TO ADM


Condition:  STABLE


Referrals:  


BRITTANY WONG MD (PCP)











CRIS SHARPE DO                  Oct 6, 2019 05:55

## 2019-10-07 VITALS — SYSTOLIC BLOOD PRESSURE: 132 MMHG | DIASTOLIC BLOOD PRESSURE: 112 MMHG

## 2019-10-07 VITALS — SYSTOLIC BLOOD PRESSURE: 162 MMHG | DIASTOLIC BLOOD PRESSURE: 60 MMHG

## 2019-10-07 VITALS — SYSTOLIC BLOOD PRESSURE: 150 MMHG | DIASTOLIC BLOOD PRESSURE: 70 MMHG

## 2019-10-07 VITALS — DIASTOLIC BLOOD PRESSURE: 92 MMHG | SYSTOLIC BLOOD PRESSURE: 170 MMHG

## 2019-10-07 VITALS — DIASTOLIC BLOOD PRESSURE: 78 MMHG | SYSTOLIC BLOOD PRESSURE: 169 MMHG

## 2019-10-07 VITALS — DIASTOLIC BLOOD PRESSURE: 75 MMHG | SYSTOLIC BLOOD PRESSURE: 166 MMHG

## 2019-10-07 VITALS — DIASTOLIC BLOOD PRESSURE: 58 MMHG | SYSTOLIC BLOOD PRESSURE: 159 MMHG

## 2019-10-07 VITALS — SYSTOLIC BLOOD PRESSURE: 142 MMHG | DIASTOLIC BLOOD PRESSURE: 66 MMHG

## 2019-10-07 VITALS — SYSTOLIC BLOOD PRESSURE: 113 MMHG | DIASTOLIC BLOOD PRESSURE: 51 MMHG

## 2019-10-07 VITALS — SYSTOLIC BLOOD PRESSURE: 122 MMHG | DIASTOLIC BLOOD PRESSURE: 58 MMHG

## 2019-10-07 VITALS — SYSTOLIC BLOOD PRESSURE: 179 MMHG | DIASTOLIC BLOOD PRESSURE: 77 MMHG

## 2019-10-07 VITALS — SYSTOLIC BLOOD PRESSURE: 110 MMHG | DIASTOLIC BLOOD PRESSURE: 50 MMHG

## 2019-10-07 VITALS — DIASTOLIC BLOOD PRESSURE: 78 MMHG | SYSTOLIC BLOOD PRESSURE: 189 MMHG

## 2019-10-07 VITALS — SYSTOLIC BLOOD PRESSURE: 109 MMHG | DIASTOLIC BLOOD PRESSURE: 59 MMHG

## 2019-10-07 VITALS — DIASTOLIC BLOOD PRESSURE: 60 MMHG | SYSTOLIC BLOOD PRESSURE: 106 MMHG

## 2019-10-07 VITALS — SYSTOLIC BLOOD PRESSURE: 158 MMHG | DIASTOLIC BLOOD PRESSURE: 75 MMHG

## 2019-10-07 VITALS — DIASTOLIC BLOOD PRESSURE: 79 MMHG | SYSTOLIC BLOOD PRESSURE: 151 MMHG

## 2019-10-07 VITALS — DIASTOLIC BLOOD PRESSURE: 74 MMHG | SYSTOLIC BLOOD PRESSURE: 141 MMHG

## 2019-10-07 VITALS — DIASTOLIC BLOOD PRESSURE: 66 MMHG | SYSTOLIC BLOOD PRESSURE: 141 MMHG

## 2019-10-07 VITALS — DIASTOLIC BLOOD PRESSURE: 72 MMHG | SYSTOLIC BLOOD PRESSURE: 155 MMHG

## 2019-10-07 VITALS — DIASTOLIC BLOOD PRESSURE: 54 MMHG | SYSTOLIC BLOOD PRESSURE: 134 MMHG

## 2019-10-07 VITALS — SYSTOLIC BLOOD PRESSURE: 184 MMHG | DIASTOLIC BLOOD PRESSURE: 74 MMHG

## 2019-10-07 LAB
ANION GAP SERPL CALC-SCNC: 10 MMOL/L (ref 6–14)
BUN SERPL-MCNC: 11 MG/DL (ref 7–20)
CALCIUM SERPL-MCNC: 8.8 MG/DL (ref 8.5–10.1)
CHLORIDE SERPL-SCNC: 103 MMOL/L (ref 98–107)
CHOLEST SERPL-MCNC: 173 MG/DL (ref 0–200)
CHOLEST/HDLC SERPL: 4 {RATIO}
CHOLEST/HDLC SERPL: 4.1 {RATIO}
CO2 SERPL-SCNC: 26 MMOL/L (ref 21–32)
CREAT SERPL-MCNC: 0.8 MG/DL (ref 0.6–1)
ERYTHROCYTE [DISTWIDTH] IN BLOOD BY AUTOMATED COUNT: 13.6 % (ref 11.5–14.5)
GFR SERPLBLD BASED ON 1.73 SQ M-ARVRAT: 71.3 ML/MIN
GLUCOSE SERPL-MCNC: 144 MG/DL (ref 70–99)
HBA1C MFR BLD: 6.9 % (ref 4.8–5.6)
HCT VFR BLD CALC: 36.2 % (ref 36–47)
HDLC SERPL-MCNC: 38 MG/DL (ref 40–60)
HDLC SERPL-MCNC: 42 MG/DL (ref 40–60)
HGB BLD-MCNC: 12.4 G/DL (ref 12–15.5)
LDLC: 112 MG/DL (ref 0–100)
LDLC: 119 MG/DL (ref 0–100)
MAGNESIUM SERPL-MCNC: 1.9 MG/DL (ref 1.8–2.4)
MCH RBC QN AUTO: 30 PG (ref 25–35)
MCHC RBC AUTO-ENTMCNC: 34 G/DL (ref 31–37)
MCV RBC AUTO: 88 FL (ref 79–100)
PLATELET # BLD AUTO: 232 X10^3/UL (ref 140–400)
POTASSIUM SERPL-SCNC: 3.9 MMOL/L (ref 3.5–5.1)
RBC # BLD AUTO: 4.13 X10^6/UL (ref 3.5–5.4)
SODIUM SERPL-SCNC: 139 MMOL/L (ref 136–145)
TRIGL SERPL-MCNC: 118 MG/DL (ref 0–150)
TRIGL SERPL-MCNC: 96 MG/DL (ref 0–150)
VLDLC: 19 MG/DL (ref 0–40)
VLDLC: 23 MG/DL (ref 0–40)
WBC # BLD AUTO: 7.6 X10^3/UL (ref 4–11)

## 2019-10-07 PROCEDURE — 027034Z DILATION OF CORONARY ARTERY, ONE ARTERY WITH DRUG-ELUTING INTRALUMINAL DEVICE, PERCUTANEOUS APPROACH: ICD-10-PCS | Performed by: INTERNAL MEDICINE

## 2019-10-07 PROCEDURE — B2111ZZ FLUOROSCOPY OF MULTIPLE CORONARY ARTERIES USING LOW OSMOLAR CONTRAST: ICD-10-PCS | Performed by: INTERNAL MEDICINE

## 2019-10-07 PROCEDURE — B2151ZZ FLUOROSCOPY OF LEFT HEART USING LOW OSMOLAR CONTRAST: ICD-10-PCS | Performed by: INTERNAL MEDICINE

## 2019-10-07 PROCEDURE — 4A023N7 MEASUREMENT OF CARDIAC SAMPLING AND PRESSURE, LEFT HEART, PERCUTANEOUS APPROACH: ICD-10-PCS | Performed by: INTERNAL MEDICINE

## 2019-10-07 RX ADMIN — INSULIN LISPRO SCH UNITS: 100 INJECTION, SOLUTION INTRAVENOUS; SUBCUTANEOUS at 16:00

## 2019-10-07 RX ADMIN — INSULIN LISPRO SCH UNITS: 100 INJECTION, SOLUTION INTRAVENOUS; SUBCUTANEOUS at 07:30

## 2019-10-07 RX ADMIN — HEPARIN SODIUM AND DEXTROSE PRN MLS/HR: 5000; 5 INJECTION INTRAVENOUS at 10:46

## 2019-10-07 RX ADMIN — LOSARTAN POTASSIUM SCH MG: 50 TABLET ORAL at 09:00

## 2019-10-07 RX ADMIN — ASPIRIN SCH MG: 325 TABLET, DELAYED RELEASE ORAL at 16:00

## 2019-10-07 RX ADMIN — INSULIN LISPRO SCH UNITS: 100 INJECTION, SOLUTION INTRAVENOUS; SUBCUTANEOUS at 21:00

## 2019-10-07 RX ADMIN — CLOPIDOGREL BISULFATE SCH MG: 75 TABLET ORAL at 16:00

## 2019-10-07 RX ADMIN — BACITRACIN SCH MLS/HR: 5000 INJECTION, POWDER, FOR SOLUTION INTRAMUSCULAR at 07:40

## 2019-10-07 RX ADMIN — INSULIN LISPRO SCH UNITS: 100 INJECTION, SOLUTION INTRAVENOUS; SUBCUTANEOUS at 11:30

## 2019-10-07 RX ADMIN — LEVOTHYROXINE SODIUM SCH MCG: 25 TABLET ORAL at 06:05

## 2019-10-07 RX ADMIN — INSULIN GLARGINE SCH UNIT: 100 INJECTION, SOLUTION SUBCUTANEOUS at 21:57

## 2019-10-07 RX ADMIN — MORPHINE SULFATE PRN MG: 2 INJECTION, SOLUTION INTRAMUSCULAR; INTRAVENOUS at 04:56

## 2019-10-07 NOTE — NUR
SS following for discharge planning. SS reviewed pt chart. Pt is from home and is currently 
on room air. SS will continue to follow for discharge planning.

## 2019-10-07 NOTE — NUR
pt now NPO at this time for scheduled cardiac cath in the AM. consents signed when 2100 meds 
given, questions answered. will pass on in report, will continue to closely monitor.

## 2019-10-07 NOTE — CARD
MR#: [id number]

Account#: [id number]

Accession#: [id number]

Date of Study: 10/07/2019

Ordering Physician: GARY KEATING, 

Referring Physician: GARY KEATING, 

Tech: RT Juan (R) RADHAMES





--------------- APPROVED REPORT --------------





Technologist: RT Juan (R) RADHAMES

Nurse: Trinidad Burnham RN



Procedure(s) performed: 1.  Left heart catheterization, selective coronary angiography and left ventr
iculography via right transradial approach

2.  Successful PCI/drug eluting stent placement to the obtuse marginal branch of left circumflex argenis
ry

contrast  149 cc's Omnipaque 300

dose   66.25 Gycm2

fluoro time  11.1 minutes

moderate sedation  47   minutes



INDICATION

The indication(s) include : non-STEMI .



[facility] Clinical Frailty Scale

[facility] Clinical Frailty Scale: Mildly Frail



Heart Failure

Heart Failure: No



PROCEDURE NARRATIVE

After explaining the risks, benefits and alternative options, informed consent was obtained from jazz
ent.  Patient was brought to the cardiac Cath Lab and right wrist was prepped and draped in the usual
 fashion after confirming a positive modified Jhonny's test.  Arterial access was obtained in the righ
t radial artery and a 6 Pakistani sheath was inserted.  6 Pakistani Jhony catheter was used to perform kinza
ective angiography of the left and right coronary arteries.  6 Pakistani pigtail catheter was used to pe
rform left ventriculography. The following findings were noted.



FINDINGS

1.  Hemodynamics: Left ventricular end-diastolic pressure of 15 mmHg.  No pullback gradient across th
e aortic valve.

2.  Left ventriculography:  Normal left ventricle systolic function with ejection fraction estimated 
at 65%.  No significant mitral regurgitation seen.

3.  Coronary angiography:

a.  The left main coronary artery arose from the left sinus of Valsalva, was very short, gave rise to
 the left anterior descending and left circumflex arteries and did not show any significant stenosis.


b.  The left anterior descending artery did not show any significant stenosis.

c.  The left circumflex artery showed 100% occlusion involving the proximal segment of the first obtu
se marginal branch.

d.  The right coronary artery arose from the right sinus of Valsalva and showed 30% stenosis in the m
idsegment. 



INTERVENTION

The left main coronary artery was engaged with a 6 Pakistani XB 3.5 guide catheter and the complete occl
usion in the proximal segment of the first obtuse marginal branch of left circumflex artery was cross
ed with a 0.014 inch Asahi Pro water guidewire. This was predilated with a 2.0 x 12 mm mini trek ball
oon and successfully treated with a 2.25 x 12 mm Xience Alpine drug-eluting stent. Follow-up angiogra
phy showed resolution of the stenosis to 0% with MICHELLE-3 distal flow. Patient tolerated the procedure 
well. Hemostasis was achieved using TR band. There were no immediate complications.







MICHELLE Flow

MICHELLE Flow (Pre-Intervention): MICHELLE-0     

MICHELLE Flow (Post-Intervention): MICHELLE-3



Conclusion

1.  Single-vessel coronary disease with 100% occlusion involving the proximal segment of the obtuse m
arginal branch of left circumflex artery.

2.  Successful PCI/drug eluting stent placement to the obtuse marginal branch of left circumflex argenis
ry.

3.  Normal left ventricle systolic function with ejection fraction estimated at 65%.



Recommendations

1.  Aspirin 325 mg daily for one month followed by 81 mg daily

2.  Plavix 75 mg daily for preferably one year

3.  Cardiovascular risk factor modification



Signed by : Ray Ellis, 

Electronically Approved : 10/07/2019 15:36:37

## 2019-10-07 NOTE — NUR
1800 Transfer S so per W/C. Hemostasis w TR Band air removed prior  to transfer . VSS,arm 
band in place w explanation to patient. Band to be removed 1 H (2000)  Visiting till 
transfer to 2 SO. BG w/o need for SSI. Stent card w patients  personal items t time of 
transfer. Denies recurrence of pain leading to hospitalization. Continue POC with detailed 
discharge instructions when needed

## 2019-10-07 NOTE — PDOC
MODERATE SEDATION ASSESSMENT


RISKS/ALTERNATIVES


Risks/Alternatives


Risks and alternatives of this type of sedation and procedure discussed with:


RISK/ALTERNATIVES:  Patient





H & P ON CHART


H & P


H & P on chart and reviewed for co-morbid conditions and appropriate labs.


H&P ON CHART:  Yes





PREGNANCY STATUS


PREG STATUS ASSESSED:  N/A





MEDS/ALLERGIES REVIEWED


Meds/Allergies Reviewed


Medications and Allergies including time and route of recently administered 

narcotics and sedatives.


MEDS/ALLERGIES REVIEWED:  Yes





ASA RATING


ASA RATING:  III





AIRWAY ASSESSMENT


Airway Assessment


Airway patency, oral function limitations, presence  of caps, crowns, dentures, 

partials, and ability to extend neck assessed.


AIRWAY ASSESSMENT:  Yes





MALLAMPATI SCORE


MALLAMPATI SCORE:  II





PRE-SEDATION ASSESSMENT


PRE-SEDATION ASSESSMENT:  Yes











JOE FARIA MD            Oct 7, 2019 14:41

## 2019-10-07 NOTE — NUR
To and from cath lab/full monitor. Asymptomatic w only c/o fleeting nausea early am now 
gone. Pre medication from lab worn off. A/o able to eat late lunch.  on return from 
lab.No SSI per parameters . All am meds held prior to procedure. Plavix//300 
respectively taken in the cath lab prior to transport. Ordered 1600 Rx held because already 
medicated in the cath lab.

## 2019-10-07 NOTE — PDOC
PROGRESS NOTES


Chief Complaint


Chief Complaint


NSTEMI -   on Heparin GTT,    to cath lab today


Sinus Bradycardia - unsure if posterior infarct


HTN -  


OA - hold mobic. 


Hypothyroidism   


Diabetes 2 -     obese, BMI 32





History of Present Illness


History of Present Illness


NPO this AM


blood sugar OK


no events


feels well





Vitals


Vitals





Vital Signs








  Date Time  Temp Pulse Resp B/P (MAP) Pulse Ox O2 Delivery O2 Flow Rate FiO2


 


10/7/19 06:00  44 18 113/51 (71) 98 Room Air  


 


10/7/19 04:00 97.7       





 97.7       











Physical Exam


General:  No acute distress


Heart:  Regular rate


Abdomen:  Normal bowel sounds


Extremities:  No clubbing, No cyanosis, No edema, Normal pulses, No 

tenderness/swelling


Skin:  No rashes, No breakdown, No significant lesion





Labs


LABS





Laboratory Tests








Test


 10/6/19


17:02 10/6/19


21:27 10/6/19


22:00 10/7/19


04:00


 


Glucose (Fingerstick)


 216 mg/dL


(70-99) 165 mg/dL


(70-99) 


 





 


Heparin Anti-Xa Act,


Unfractionated 


 


 0.51 IU/mL


(0.30-0.70) 0.39 IU/mL


(0.30-0.70)


 


White Blood Count


 


 


 


 7.6 x10^3/uL


(4.0-11.0)


 


Red Blood Count


 


 


 


 4.13 x10^6/uL


(3.50-5.40)


 


Hemoglobin


 


 


 


 12.4 g/dL


(12.0-15.5)


 


Hematocrit


 


 


 


 36.2 %


(36.0-47.0)


 


Mean Corpuscular Volume    88 fL () 


 


Mean Corpuscular Hemoglobin    30 pg (25-35) 


 


Mean Corpuscular Hemoglobin


Concent 


 


 


 34 g/dL


(31-37)


 


Red Cell Distribution Width


 


 


 


 13.6 %


(11.5-14.5)


 


Platelet Count


 


 


 


 232 x10^3/uL


(140-400)


 


Sodium Level


 


 


 


 139 mmol/L


(136-145)


 


Potassium Level


 


 


 


 3.9 mmol/L


(3.5-5.1)


 


Chloride Level


 


 


 


 103 mmol/L


()


 


Carbon Dioxide Level


 


 


 


 26 mmol/L


(21-32)


 


Anion Gap    10 (6-14) 


 


Blood Urea Nitrogen


 


 


 


 11 mg/dL


(7-20)


 


Creatinine


 


 


 


 0.8 mg/dL


(0.6-1.0)


 


Estimated GFR


(Cockcroft-Gault) 


 


 


 71.3 





 


Glucose Level


 


 


 


 144 mg/dL


(70-99)


 


Calcium Level


 


 


 


 8.8 mg/dL


(8.5-10.1)


 


Magnesium Level


 


 


 


 1.9 mg/dL


(1.8-2.4)


 


Triglycerides Level


 


 


 


 96 mg/dL


(0-150)


 


Cholesterol Level


 


 


 


 173 mg/dL


(0-200)


 


LDL Cholesterol, Calculated


 


 


 


 112 mg/dL


(0-100)


 


VLDL Cholesterol, Calculated


 


 


 


 19 mg/dL


(0-40)


 


Non-HDL Cholesterol Calculated


 


 


 


 131 mg/dL


(0-129)


 


HDL Cholesterol


 


 


 


 42 mg/dL


(40-60)


 


Cholesterol/HDL Ratio    4.1 


 


Thyroid Stimulating Hormone


(TSH) 


 


 


 6.752 uIU/mL


(0.358-3.74)











Review of Systems


Review of Systems


pain better


no n..v.d





Comment


Review of Relevant


I have reviewed the following items patricia (where applicable) has been applied.


Labs





Laboratory Tests








Test


 10/6/19


17:02 10/6/19


21:27 10/6/19


22:00 10/7/19


04:00


 


Glucose (Fingerstick)


 216 mg/dL


(70-99) 165 mg/dL


(70-99) 


 





 


Heparin Anti-Xa Act,


Unfractionated 


 


 0.51 IU/mL


(0.30-0.70) 0.39 IU/mL


(0.30-0.70)


 


White Blood Count


 


 


 


 7.6 x10^3/uL


(4.0-11.0)


 


Red Blood Count


 


 


 


 4.13 x10^6/uL


(3.50-5.40)


 


Hemoglobin


 


 


 


 12.4 g/dL


(12.0-15.5)


 


Hematocrit


 


 


 


 36.2 %


(36.0-47.0)


 


Mean Corpuscular Volume    88 fL () 


 


Mean Corpuscular Hemoglobin    30 pg (25-35) 


 


Mean Corpuscular Hemoglobin


Concent 


 


 


 34 g/dL


(31-37)


 


Red Cell Distribution Width


 


 


 


 13.6 %


(11.5-14.5)


 


Platelet Count


 


 


 


 232 x10^3/uL


(140-400)


 


Sodium Level


 


 


 


 139 mmol/L


(136-145)


 


Potassium Level


 


 


 


 3.9 mmol/L


(3.5-5.1)


 


Chloride Level


 


 


 


 103 mmol/L


()


 


Carbon Dioxide Level


 


 


 


 26 mmol/L


(21-32)


 


Anion Gap    10 (6-14) 


 


Blood Urea Nitrogen


 


 


 


 11 mg/dL


(7-20)


 


Creatinine


 


 


 


 0.8 mg/dL


(0.6-1.0)


 


Estimated GFR


(Cockcroft-Gault) 


 


 


 71.3 





 


Glucose Level


 


 


 


 144 mg/dL


(70-99)


 


Calcium Level


 


 


 


 8.8 mg/dL


(8.5-10.1)


 


Magnesium Level


 


 


 


 1.9 mg/dL


(1.8-2.4)


 


Triglycerides Level


 


 


 


 96 mg/dL


(0-150)


 


Cholesterol Level


 


 


 


 173 mg/dL


(0-200)


 


LDL Cholesterol, Calculated


 


 


 


 112 mg/dL


(0-100)


 


VLDL Cholesterol, Calculated


 


 


 


 19 mg/dL


(0-40)


 


Non-HDL Cholesterol Calculated


 


 


 


 131 mg/dL


(0-129)


 


HDL Cholesterol


 


 


 


 42 mg/dL


(40-60)


 


Cholesterol/HDL Ratio    4.1 


 


Thyroid Stimulating Hormone


(TSH) 


 


 


 6.752 uIU/mL


(0.358-3.74)








Laboratory Tests








Test


 10/6/19


17:02 10/6/19


21:27 10/6/19


22:00 10/7/19


04:00


 


Glucose (Fingerstick)


 216 mg/dL


(70-99) 165 mg/dL


(70-99) 


 





 


Heparin Anti-Xa Act,


Unfractionated 


 


 0.51 IU/mL


(0.30-0.70) 0.39 IU/mL


(0.30-0.70)


 


White Blood Count


 


 


 


 7.6 x10^3/uL


(4.0-11.0)


 


Red Blood Count


 


 


 


 4.13 x10^6/uL


(3.50-5.40)


 


Hemoglobin


 


 


 


 12.4 g/dL


(12.0-15.5)


 


Hematocrit


 


 


 


 36.2 %


(36.0-47.0)


 


Mean Corpuscular Volume    88 fL () 


 


Mean Corpuscular Hemoglobin    30 pg (25-35) 


 


Mean Corpuscular Hemoglobin


Concent 


 


 


 34 g/dL


(31-37)


 


Red Cell Distribution Width


 


 


 


 13.6 %


(11.5-14.5)


 


Platelet Count


 


 


 


 232 x10^3/uL


(140-400)


 


Sodium Level


 


 


 


 139 mmol/L


(136-145)


 


Potassium Level


 


 


 


 3.9 mmol/L


(3.5-5.1)


 


Chloride Level


 


 


 


 103 mmol/L


()


 


Carbon Dioxide Level


 


 


 


 26 mmol/L


(21-32)


 


Anion Gap    10 (6-14) 


 


Blood Urea Nitrogen


 


 


 


 11 mg/dL


(7-20)


 


Creatinine


 


 


 


 0.8 mg/dL


(0.6-1.0)


 


Estimated GFR


(Cockcroft-Gault) 


 


 


 71.3 





 


Glucose Level


 


 


 


 144 mg/dL


(70-99)


 


Calcium Level


 


 


 


 8.8 mg/dL


(8.5-10.1)


 


Magnesium Level


 


 


 


 1.9 mg/dL


(1.8-2.4)


 


Triglycerides Level


 


 


 


 96 mg/dL


(0-150)


 


Cholesterol Level


 


 


 


 173 mg/dL


(0-200)


 


LDL Cholesterol, Calculated


 


 


 


 112 mg/dL


(0-100)


 


VLDL Cholesterol, Calculated


 


 


 


 19 mg/dL


(0-40)


 


Non-HDL Cholesterol Calculated


 


 


 


 131 mg/dL


(0-129)


 


HDL Cholesterol


 


 


 


 42 mg/dL


(40-60)


 


Cholesterol/HDL Ratio    4.1 


 


Thyroid Stimulating Hormone


(TSH) 


 


 


 6.752 uIU/mL


(0.358-3.74)








Medications





Current Medications


Heparin Sodium/ Dextrose 500 ml @  19.8 mls/hr CONT  PRN IV SEE I/O RECORD Last 

administered on 10/6/19at 13:30;  Start 10/6/19 at 13:00


Heparin Sodium (Porcine) (Heparin Sodium) 2,050 unit PRN Q6HRS  PRN IV FOR UFH 

LEVEL LESS THAN 0.2;  Start 10/6/19 at 13:00


Morphine Sulfate (Morphine Sulfate) 2 mg PRN Q2HR  PRN IV SEVERE PAIN Last 

administered on 10/7/19at 04:56;  Start 10/6/19 at 13:45


Morphine Sulfate (Morphine Sulfate) 1 mg PRN Q2HR  PRN IV MODERATE PAIN Last 

administered on 10/6/19at 13:50;  Start 10/6/19 at 13:45


Ondansetron HCl (Zofran) 4 mg PRN Q4HRS  PRN IV NAUSEA/VOMITING;  Start 10/6/19 

at 14:30


Acetaminophen (Tylenol) 650 mg PRN Q4HRS  PRN PO TEMP OVER 100.4F OR MILD PAIN; 

Start 10/6/19 at 14:30


Insulin Glargine (Lantus Syringe) 8 unit QHS SQ  Last administered on 10/6/19at 

21:36;  Start 10/6/19 at 21:00


Insulin Human Lispro (HumaLOG) 0-5 UNITS TIDACHC SQ  Last administered on 

10/6/19at 17:05;  Start 10/6/19 at 16:30


Dextrose (Dextrose 50%-Water Syringe) 12.5 gm PRN Q15MIN  PRN IV SEE COMMENTS;  

Start 10/6/19 at 14:30


Levothyroxine Sodium (Synthroid) 25 mcg DAILY06 PO  Last administered on 

10/7/19at 06:05;  Start 10/7/19 at 06:00


Losartan Potassium (Cozaar) 50 mg DAILY PO  Last administered on 10/6/19at 

15:34;  Start 10/6/19 at 15:00


Hydrochlorothiazide (Microzide) 12.5 mg DAILY PO  Last administered on 10/6/19at

15:34;  Start 10/6/19 at 15:00


Sodium Chloride 1,000 ml @  75 mls/hr [id number] IV  Last administered on 10/6/19at

21:22;  Start 10/6/19 at 18:20





Active Scripts


Active


Reported


Metformin Hcl Er (Metformin Hcl) 500 Mg Tab.er.24h 500 Mg PO DAILYWBKFT


Hydrochlorothiazide Tablet (Hydrochlorothiazide) 12.5 Mg Tablet 12.5 Mg PO DAILY


Meloxicam 15 Mg Tablet 1 Tab PO DAILY


Losartan-Hctz 50-12.5 Mg Tab (Losartan/Hydrochlorothiazide) 1 Each Tablet 1 Tab 

PO DAILY


Levothyroxine Sodium 25 Mcg Tablet 1 Tab PO DAILY


Vitals/I & O





Vital Sign - Last 24 Hours








 10/6/19 10/6/19 10/6/19 10/6/19





 12:20 12:45 13:00 13:50


 


Temp 98.0   





 98.0   


 


Pulse 54 47 48 


 


Resp 12 12 12 12


 


B/P (MAP) 169/61 (97) 157/76 (103) 147/70 (95) 


 


Pulse Ox 95 98 96 97


 


O2 Delivery Room Air Room Air Room Air Room Air


 


    





    





 10/6/19 10/6/19 10/6/19 10/6/19





 14:53 15:00 15:34 16:00


 


Temp    97.9





    97.9


 


Pulse 49 49 49 52


 


Resp 16 16  16


 


B/P (MAP) 138/70 (92) 153/67 (95) 153/67 159/72 (101)


 


Pulse Ox 94 99  95


 


O2 Delivery Room Air Room Air  Room Air


 


    





    





 10/6/19 10/6/19 10/6/19 10/6/19





 17:00 17:08 18:00 18:10


 


Pulse 44  46 


 


Resp 16 16 16 


 


B/P (MAP) 159/75 (103)  122/54 (76) 


 


Pulse Ox 97  98 


 


O2 Delivery Room Air Room Air Room Air Room Air





 10/6/19 10/6/19 10/6/19 10/6/19





 19:00 20:00 21:00 22:00


 


Temp  97.9  





  97.9  


 


Pulse 44 45 45 43


 


Resp 18 14 14 16


 


B/P (MAP) 105/59 (74) 104/50 (68) 165/74 (104) 152/69 (96)


 


Pulse Ox 97 98 96 98


 


O2 Delivery Room Air Room Air Room Air Room Air


 


    





    





 10/6/19 10/7/19 10/7/19 10/7/19





 23:00 00:00 01:00 02:00


 


Temp  98.0  





  98.0  


 


Pulse 42 45 44 48


 


Resp 12 18 12 16


 


B/P (MAP) 144/63 (90) 151/79 (103) 141/66 (91) 110/50 (70)


 


Pulse Ox 97 97 96 97


 


O2 Delivery Room Air Room Air Room Air Room Air


 


    





    





 10/7/19 10/7/19 10/7/19 10/7/19





 03:00 04:00 04:56 05:00


 


Temp  97.7  





  97.7  


 


Pulse 45 46  42


 


Resp 16 14 18 14


 


B/P (MAP) 134/54 (80) 141/74 (96)  106/60 (75)


 


Pulse Ox 97 98 98 98


 


O2 Delivery Room Air Room Air Room Air Room Air


 


    





    





 10/7/19 10/7/19  





 05:33 06:00  


 


Pulse  44  


 


Resp 18 18  


 


B/P (MAP)  113/51 (71)  


 


Pulse Ox 98 98  


 


O2 Delivery Room Air Room Air  














Intake and Output   


 


 10/6/19 10/6/19 10/7/19





 14:59 22:59 06:59


 


Intake Total 120 ml 717.5 ml 1114.0 ml


 


Balance 120 ml 717.5 ml 1114.0 ml

















ACE DEAN MD                  Oct 7, 2019 10:17

## 2019-10-07 NOTE — CARD
MR#: [id number]

Account#: [id number]

Accession#: [id number]

Date of Study: 10/07/2019

Ordering Physician: GABY MCGILL, 

Referring Physician: GABY MCGILL, 

Tech: Shalini Yang KATIUSKA





--------------- APPROVED REPORT --------------





EXAM: Two-dimensional and M-mode echocardiogram with Doppler and color Doppler.



Other Information 

Quality : AverageHR: 44bpm

Rhythm : Bradycardia



INDICATION

Non STEMI



2D DIMENSIONS 

RVDd2.9 (2.9-3.5cm)Left Atrium(2D)3.2 (1.6-4.0cm)

IVSd1.2 (0.7-1.1cm)Aortic Root(2D)3.2 (2.0-3.7cm)

LVDd4.6 (3.9-5.9cm)LVOT Diameter2.1 (1.8-2.4cm)

PWd1.0 (0.7-1.1cm)LVDs2.9 (2.5-4.0cm)

FS (%) 36.1 %SV64.1 ml

LVEF(%)65.8 (>50%)



M-Mode DIMENSIONS 

Left Atrium(MM)4.10 (2.5-4.0cm)Aortic Root3.37 (2.2-3.7cm)



Aortic Valve

AoV Peak Rad.130.1cm/sAoV VTI28.6cm

AO Peak GR.6.8mmHgLVOT  VTI 21.64cm

AO Mean GR.3mmHgAVA   (VTI)2.60cm2



Mitral Valve

MV E Cuoqiyml07.7cm/sMV DECEL [id number]

MV A Ybzrczzc57.4cm/sE/A  Ratio3.7

MV A [id number]



TDI

Lateral E' P. V9.72cm/sMedial E' P. V9.98cm/s

E/Lateral E'7.8E/Medial E'7.6



 LEFT VENTRICLE 

The left ventricle is normal size. There is borderline to mild concentric left ventricular hypertroph
y. The left ventricular systolic function is normal. The Ejection Fraction is 60-65%. There is normal
 LV segmental wall motion. Transmitral Doppler flow pattern is Grade II-pseudonormal filling dynamics
.



 RIGHT VENTRICLE 

The right ventricle is normal size. There is normal right ventricular wall thickness. The right ventr
icular systolic function is normal.



 ATRIA 

The left atrium is mildly dilated. The right atrium size is normal. The interatrial septum is intact 
with no evidence for an atrial septal defect or patent foramen ovale as noted on 2-D or Doppler imagi
ng.



 AORTIC VALVE 

The aortic valve is normal in structure and function. The aortic valve is trileaflet. Doppler and Col
or Flow revealed no significant aortic regurgitation. There is no significant aortic valvular stenosi
s. There is no aortic valvular vegetation.



 MITRAL VALVE 

The mitral valve is normal in structure and function. There is no evidence of mitral valve prolapse. 
There is no mitral valve stenosis. Doppler and Color Flow revealed no mitral valve regurgitation note
d.



 TRICUSPID VALVE 

The tricuspid valve is normal in structure and function. Doppler and Color Flow revealed no tricuspid
 valve regurgitation noted. There is no tricuspid valve prolapse or vegetation. There is no tricuspid
 valve stenosis.



 PULMONIC VALVE 

The pulmonic valve is not well visualized.



 GREAT VESSELS 

The aortic root is normal in size. The ascending aorta is normal in size. The IVC is normal in size a
nd collapses >50% with inspiration.



 PERICARDIAL EFFUSION 

There is no evidence of significant pericardial effusion.



Critical Notification

Critical Value: No



<Conclusion>

The left ventricular systolic function is normal.

The Ejection Fraction is 60-65%.

There is normal LV segmental wall motion.

There is no evidence of significant pericardial effusion.



Signed by : Ray Ellis, 

Electronically Approved : 10/07/2019 10:28:26

## 2019-10-08 VITALS — SYSTOLIC BLOOD PRESSURE: 168 MMHG | DIASTOLIC BLOOD PRESSURE: 74 MMHG

## 2019-10-08 VITALS — DIASTOLIC BLOOD PRESSURE: 72 MMHG | SYSTOLIC BLOOD PRESSURE: 169 MMHG

## 2019-10-08 VITALS — DIASTOLIC BLOOD PRESSURE: 77 MMHG | SYSTOLIC BLOOD PRESSURE: 190 MMHG

## 2019-10-08 VITALS — DIASTOLIC BLOOD PRESSURE: 65 MMHG | SYSTOLIC BLOOD PRESSURE: 147 MMHG

## 2019-10-08 LAB
ALBUMIN SERPL-MCNC: 3.4 G/DL (ref 3.4–5)
ALBUMIN/GLOB SERPL: 0.9 {RATIO} (ref 1–1.7)
ALP SERPL-CCNC: 92 U/L (ref 46–116)
ALT SERPL-CCNC: 20 U/L (ref 14–59)
ANION GAP SERPL CALC-SCNC: 11 MMOL/L (ref 6–14)
AST SERPL-CCNC: 43 U/L (ref 15–37)
BILIRUB SERPL-MCNC: 0.6 MG/DL (ref 0.2–1)
BUN SERPL-MCNC: 9 MG/DL (ref 7–20)
BUN/CREAT SERPL: 11 (ref 6–20)
CALCIUM SERPL-MCNC: 9.2 MG/DL (ref 8.5–10.1)
CHLORIDE SERPL-SCNC: 105 MMOL/L (ref 98–107)
CO2 SERPL-SCNC: 25 MMOL/L (ref 21–32)
CREAT SERPL-MCNC: 0.8 MG/DL (ref 0.6–1)
GFR SERPLBLD BASED ON 1.73 SQ M-ARVRAT: 71.3 ML/MIN
GLOBULIN SER-MCNC: 4 G/DL (ref 2.2–3.8)
GLUCOSE SERPL-MCNC: 127 MG/DL (ref 70–99)
HBA1C MFR BLD: 6.9 % (ref 4.8–5.6)
POTASSIUM SERPL-SCNC: 3.7 MMOL/L (ref 3.5–5.1)
PROT SERPL-MCNC: 7.4 G/DL (ref 6.4–8.2)
SODIUM SERPL-SCNC: 141 MMOL/L (ref 136–145)

## 2019-10-08 RX ADMIN — CLOPIDOGREL BISULFATE SCH MG: 75 TABLET ORAL at 08:51

## 2019-10-08 RX ADMIN — INSULIN LISPRO SCH UNITS: 100 INJECTION, SOLUTION INTRAVENOUS; SUBCUTANEOUS at 07:30

## 2019-10-08 RX ADMIN — INSULIN LISPRO SCH UNITS: 100 INJECTION, SOLUTION INTRAVENOUS; SUBCUTANEOUS at 11:30

## 2019-10-08 RX ADMIN — LOSARTAN POTASSIUM SCH MG: 50 TABLET ORAL at 08:51

## 2019-10-08 RX ADMIN — LEVOTHYROXINE SODIUM SCH MCG: 25 TABLET ORAL at 06:01

## 2019-10-08 RX ADMIN — ASPIRIN SCH MG: 325 TABLET, DELAYED RELEASE ORAL at 08:51

## 2019-10-08 NOTE — NUR
Discharge Note:



JAVIER SMITH



Discharge instructions and discharge home medications reviewed with Patient and a copy 
given. All questions have been answered and understanding verbalized. 



The following instructions and handouts were given: NSTEMI, cardiac diet, post cath 
teaching, and BP measurement. 



Discontinued iv lines and catheter intact.



Patient discharged to home with self-care via private vehicle.

## 2019-10-08 NOTE — PDOC
CARDIO Progress Notes


Date and Time


Date of Service


10/8/2019


Time of Evaluation


1340





Subjective


Subjective:  No Chest Pain, No shortness of breath, No Palpitations





Vitals


Vitals





Vital Signs








  Date Time  Temp Pulse Resp B/P (MAP) Pulse Ox O2 Delivery O2 Flow Rate FiO2


 


10/8/19 11:00 97.4 53 18 169/72 (104) 96 Room Air  





 97.4       


 


10/7/19 14:30       2.0 








Weight


Weight [ ]





Input and Output


Intake and Output











Intake and Output 


 


 10/8/19





 06:59


 


Intake Total 1423 ml


 


Output Total 1956 ml


 


Balance -533 ml


 


 


 


Intake Oral 1040 ml


 


IV Total 383 ml


 


Output Urine Total 1956 ml


 


# Voids 2











Laboratory


Labs





Laboratory Tests








Test


 10/7/19


15:38 10/7/19


21:52 10/8/19


07:31 10/8/19


08:42


 


Glucose (Fingerstick)


 112 mg/dL


(70-99) 129 mg/dL


(70-99) 121 mg/dL


(70-99) 





 


Sodium Level


 


 


 


 141 mmol/L


(136-145)


 


Potassium Level


 


 


 


 3.7 mmol/L


(3.5-5.1)


 


Chloride Level


 


 


 


 105 mmol/L


()


 


Carbon Dioxide Level


 


 


 


 25 mmol/L


(21-32)


 


Anion Gap    11 (6-14) 


 


Blood Urea Nitrogen    9 mg/dL (7-20) 


 


Creatinine


 


 


 


 0.8 mg/dL


(0.6-1.0)


 


Estimated GFR


(Cockcroft-Gault) 


 


 


 71.3 





 


BUN/Creatinine Ratio    11 (6-20) 


 


Glucose Level


 


 


 


 127 mg/dL


(70-99)


 


Calcium Level


 


 


 


 9.2 mg/dL


(8.5-10.1)


 


Total Bilirubin


 


 


 


 0.6 mg/dL


(0.2-1.0)


 


Aspartate Amino Transf


(AST/SGOT) 


 


 


 43 U/L (15-37) 





 


Alanine Aminotransferase


(ALT/SGPT) 


 


 


 20 U/L (14-59) 





 


Alkaline Phosphatase


 


 


 


 92 U/L


()


 


Total Protein


 


 


 


 7.4 g/dL


(6.4-8.2)


 


Albumin


 


 


 


 3.4 g/dL


(3.4-5.0)


 


Albumin/Globulin Ratio    0.9 (1.0-1.7) 


 


Test


 10/8/19


11:22 


 


 





 


Glucose (Fingerstick)


 149 mg/dL


(70-99) 


 


 














Physical Exam


HEENT:  Neck Supple W Full Motion


Chest:  Symmetric


LUNGS:  Clear to Auscultation


Heart:  S1S2, RRR (SR)


Abdomen:  Soft N/T


Extremities:  No Calf Tenderness


Neurology:  alert, oriented, follow commands





Assessment


Assessment


1. NSTEMI: S/P PCI/CIELO to OM. EF 55%


2. Asymptomatic SB


3. DM2; per PCP. A1C 6.9


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


5. HLP


6. Subclinical hypothyroidism


7. HTN: labile episode





recommendations


1 ASA/plavix.  for 1 mo then 81 mg therafter


2. Unable to place on BB due to bradycardia


3. Statin.  Pt refused cardiac rehab


4. Increase losartan


5. Follow up in office.











ZELDA OHARA           Oct 8, 2019 13:58

## 2019-10-08 NOTE — NUR
pt bp elevated throughout shift dr coley notified, order given for hydralazine 10mg prn, 
will cont to monitor pt status and safety. pmrn

## 2019-10-08 NOTE — PDOC
[facility] PROGRESS NOTE


Chief Complaint


Chief Complaint


NSTEMI


Sinus Bradycardia 


HTN


OA


Hypothyroidism   


Diabetes 2 


Obese, BMI 32





History of Present Illness


History of Present Illness


10/8/19


Pt seen and examined


Pt is feeling better after her cath yesterday and wants to be discharged home 

today


EF = 65% 


Pt was concerned her blood pressure was high today running in the 160s/70s but 

she had stopped her medication the past couple days. We restarted her home 

medications. 


RANDELL RN 








NPO this AM


blood sugar OK


no events


feels well





Vitals/I&O


Vitals/I&O:





                                   Vital Signs








  Date Time  Temp Pulse Resp B/P (MAP) Pulse Ox O2 Delivery O2 Flow Rate FiO2


 


10/8/19 08:51  47  168/74    


 


10/8/19 07:00 97.6  18  98 Room Air  





 97.6       


 


10/7/19 14:30       2.0 














                                    I & O   


 


 10/7/19 10/7/19 10/8/19





 15:00 23:00 07:00


 


Intake Total 50 ml 1073 ml 300 ml


 


Output Total 4 ml 1002 ml 950 ml


 


Balance 46 ml 71 ml -650 ml











Physical Exam


General:  No acute distress


Heart:  Regular rate


Abdomen:  Normal bowel sounds


Extremities:  No clubbing, No cyanosis, No edema, Normal pulses, No 

tenderness/swelling


Skin:  No rashes, No breakdown, No significant lesion





Labs


Labs:





Laboratory Tests








Test


 10/7/19


15:38 10/7/19


21:52 10/8/19


07:31 10/8/19


08:42


 


Glucose (Fingerstick)


 112 mg/dL


(70-99) 129 mg/dL


(70-99) 121 mg/dL


(70-99) 





 


Sodium Level


 


 


 


 141 mmol/L


(136-145)


 


Potassium Level


 


 


 


 3.7 mmol/L


(3.5-5.1)


 


Chloride Level


 


 


 


 105 mmol/L


()


 


Carbon Dioxide Level


 


 


 


 25 mmol/L


(21-32)


 


Anion Gap    11 (6-14) 


 


Blood Urea Nitrogen    9 mg/dL (7-20) 


 


Creatinine


 


 


 


 0.8 mg/dL


(0.6-1.0)


 


Estimated GFR


(Cockcroft-Gault) 


 


 


 71.3 





 


BUN/Creatinine Ratio    11 (6-20) 


 


Glucose Level


 


 


 


 127 mg/dL


(70-99)


 


Calcium Level


 


 


 


 9.2 mg/dL


(8.5-10.1)


 


Total Bilirubin


 


 


 


 0.6 mg/dL


(0.2-1.0)


 


Aspartate Amino Transf


(AST/SGOT) 


 


 


 43 U/L (15-37) 





 


Alanine Aminotransferase


(ALT/SGPT) 


 


 


 20 U/L (14-59) 





 


Alkaline Phosphatase


 


 


 


 92 U/L


()


 


Total Protein


 


 


 


 7.4 g/dL


(6.4-8.2)


 


Albumin


 


 


 


 3.4 g/dL


(3.4-5.0)


 


Albumin/Globulin Ratio    0.9 (1.0-1.7) 











Review of Systems


Review of Systems:


Denies n/v/d


Denies chest pain





Assessment and Plan


Assessmemt and Plan


Assessment


NSTEMI


Sinus bradycardia


HTN


Hypothyroidism


DM2


Obese





Plan 


Cardiac monitoring


Home meds


DVT prophylaxis


Full diet 


Monitor blood sugars


Discharge today if okay with cardiology





Comment


Review of Relevant


I have reviewed the following items patricia (where applicable) has been applied.


Medications:





Current Medications








 Medications


  (Trade)  Dose


 Ordered  Sig/Anabel


 Route


 PRN Reason  Start Time


 Stop Time Status Last Admin


Dose Admin


 


 Nitroglycerin


  (Nitroglycerin)  400 mcg  1X  ONCE


 IART


   10/7/19 14:30


 10/7/19 14:41 DC 10/7/19 14:30





 


 Verapamil HCl


  (Verapamil)  2.5 mg  1X  ONCE


 IART


   10/7/19 14:30


 10/7/19 14:41 DC 10/7/19 14:30





 


 Heparin Sodium


  (Porcine)


  (Heparin Sodium)  2,500 unit  1X  ONCE


 IART


   10/7/19 14:30


 10/7/19 14:41 DC 10/7/19 14:30





 


 Heparin Sodium/


 Sodium Chloride


  (HEPARIN for


 ARTERIAL LINE


 FLUSH)  1,000 unit  1X  ONCE


 IART


   10/7/19 14:30


 10/7/19 14:41 DC 10/7/19 14:30





 


 Midazolam HCl


  (Versed)  2 mg  1X  ONCE


 IV


   10/7/19 14:30


 10/7/19 14:41 DC 10/7/19 14:30





 


 Fentanyl Citrate


  (Fentanyl 2ml


 Vial)  50 mcg  1X  ONCE


 IV


   10/7/19 14:30


 10/7/19 14:41 DC 10/7/19 14:30





 


 Iohexol


  (Omnipaque 300


 Mg/ml)  100 ml  1X  ONCE


 IART


   10/7/19 14:30


 10/7/19 14:41 DC 10/7/19 14:30





 


 Bivalirudin


  (Angiomax)  250 mg  1X  ONCE


 IV


   10/7/19 14:30


 10/7/19 14:41 DC 10/7/19 14:30





 


 Clopidogrel


 Bisulfate


  (Plavix)  600 mg  1X  ONCE


 PO


   10/7/19 14:30


 10/7/19 14:41 DC 10/7/19 14:30





 


 Aspirin


  (Dottie Aspirin)  325 mg  1X  ONCE


 PO


   10/7/19 15:00


 10/7/19 15:01 DC 10/7/19 14:54





 


 Lidocaine HCl


  (Lidocaine 1%


 20ml Vial)  1 ml  1X  ONCE


 INJ


   10/7/19 14:30


 10/7/19 14:41 DC 10/7/19 14:30





 


 Sodium Chloride  1,000 ml @ 


 100 mls/hr  Q10H


 IV


   10/7/19 14:42


    10/7/19 14:42





 


 Aspirin


  (Ecotrin)  325 mg  DAILYWBKFT


 PO


   10/7/19 16:00


    10/8/19 08:51





 


 Clopidogrel


 Bisulfate


  (Plavix)  75 mg  DAILYWBKFT


 PO


   10/7/19 16:00


    10/8/19 08:51





 


 Atorvastatin


 Calcium


  (Lipitor)  40 mg  QHS


 PO


   10/7/19 21:00


    10/7/19 21:43





 


 Hydralazine HCl


  (Apresoline Inj)  10 mg  PRN Q4HRS  PRN


 IVP


 ELEVATED BP, SEE COMMENTS  10/8/19 03:00


    10/8/19 03:05




















DARYA CHEN III DO            Oct 8, 2019 09:52

## 2019-10-11 NOTE — DS
DATE OF DISCHARGE:  10/08/2019



ADMISSION DIAGNOSES:  Acute myocardial infarction and chest pain with elevated

troponin to 1.2.



DISCHARGE DIAGNOSIS:  Resolving acute myocardial infarction.



HOSPITAL COURSE:  The patient is a pleasant 69-year-old female who presented

with chest pain and was noted to have an elevation of troponin to 1.2.  We

admitted the patient, did cardiac monitoring, did serial enzymes, serial EKGs. 

We consulted Cardiology, did a full cardiac workup.  On 10/08/2019, she looked

great.  We discharged to home with close outpatient followup.



DISPOSITION:  Home.



ACTIVITY:  As tolerated.



DIET:  Cardiac.



MEDICATIONS:  Please see the MRAD.



TOTAL TIME:  32 minutes.

 



______________________________

DARYA CHEN DO



DR:  REZA/edil  JOB#:  [id number] / [id number]

DD:  10/11/2019 08:41  DT:  10/11/2019 08:53

## 2020-11-17 ENCOUNTER — HOSPITAL ENCOUNTER (OUTPATIENT)
Dept: HOSPITAL 63 - MAMMO | Age: 70
End: 2020-11-17
Attending: SPECIALIST
Payer: MEDICARE

## 2020-11-17 DIAGNOSIS — N64.89: ICD-10-CM

## 2020-11-17 DIAGNOSIS — N63.0: Primary | ICD-10-CM

## 2020-11-17 PROCEDURE — 76641 ULTRASOUND BREAST COMPLETE: CPT

## 2020-11-17 PROCEDURE — 77066 DX MAMMO INCL CAD BI: CPT

## 2020-11-17 NOTE — RAD
DATE: 11/17/2020 2:00 PM



EXAM: MAMMO [place] [facility], BREAST BILATERAL



HISTORY:  70-year-old woman due for mammographic screening presents with

tenderness in both breasts, no palpable abnormality on self exam. Patient

reports that her provider questioned a palpable area where her focal

tenderness was located.



COMPARISON: None. This will serve as a new baseline.



Bilateral CC and MLO views of the breasts were performed. Bilateral breast

tomosynthesis was performed in CC and MLO projections.



This study was interpreted with the benefit of Computerized Aided Detection

(CAD). Targeted ultrasound of the right breast and left chest wall in the

areas of tenderness was also performed.





FINDINGS:



Breast Density: FATTY  The Breast Parenchyma is primarily fatty replaced.

Breast parenchyma level density A.



A BB marker in the upper inner right breast at the approximate 1:00 position

12 cm from the nipple identifies the patient's reported area of focal

tenderness in the right breast. A second BB marker in the far lateral left

breast projecting over the pectoralis muscle and is not visible on the cc view

is present, identifying the area of focal tenderness in the lateral left chest

wall. There are no mammographic correlates to either areas of focal

tenderness. No suspicious masses, microcalcifications or architectural

distortion is present to suggest malignancy in either breast. The visualized

axillae are unremarkable.



Furthermore, targeted ultrasound of the bilateral breasts in the areas of

clinical concern including  the right breast 1:00 position 10 cm from the

nipple (on ultrasound) and the 11:00 left breast 11 cm from the nipple,

revealed no sonographic correlate to the areas of focal tenderness. Patient

reports at this visit that the tenderness has abated.



IMPRESSION: Negative bilateral mammogram and targeted breast ultrasound. No

evidence of malignancy. 



BI-RADS CATEGORY: 1 NEGATIVE



RECOMMENDED FOLLOW-UP: CLIN FOLLOW UP IMAGING AS CLINICALLY INDICATED Annual

screening mammography is recommended, unless clinically indicated sooner based

on symptoms or change in physical exam.



PQRS compliance statement: Patient information was entered into a reminder

system with a target due date for the next mammogram.



Mammography is a sensitive method for finding small breast cancers, but it

does not detect them all and is not a substitute for careful clinical

examination.  A negative mammogram does not negate a clinically suspicious

finding and should not result in delay in biopsying a clinically suspicious

abnormality.



"Our facility is accredited by the [employer] Mammography

Program."

## 2022-04-28 ENCOUNTER — HOSPITAL ENCOUNTER (EMERGENCY)
Dept: HOSPITAL 63 - ER | Age: 72
Discharge: HOME | End: 2022-04-28
Payer: MEDICARE

## 2022-04-28 VITALS — HEIGHT: 65 IN | BODY MASS INDEX: 31.7 KG/M2 | WEIGHT: 190.26 LBS

## 2022-04-28 VITALS — DIASTOLIC BLOOD PRESSURE: 54 MMHG | SYSTOLIC BLOOD PRESSURE: 147 MMHG

## 2022-04-28 DIAGNOSIS — M19.90: ICD-10-CM

## 2022-04-28 DIAGNOSIS — R07.89: Primary | ICD-10-CM

## 2022-04-28 DIAGNOSIS — I10: ICD-10-CM

## 2022-04-28 DIAGNOSIS — Z88.8: ICD-10-CM

## 2022-04-28 DIAGNOSIS — M54.2: ICD-10-CM

## 2022-04-28 DIAGNOSIS — E03.9: ICD-10-CM

## 2022-04-28 DIAGNOSIS — E11.9: ICD-10-CM

## 2022-04-28 DIAGNOSIS — R51.9: ICD-10-CM

## 2022-04-28 LAB
ALBUMIN SERPL-MCNC: 3.5 G/DL (ref 3.4–5)
ALBUMIN/GLOB SERPL: 1.1 {RATIO} (ref 1–1.7)
ALP SERPL-CCNC: 76 U/L (ref 46–116)
ALT SERPL-CCNC: 22 U/L (ref 14–59)
ANION GAP SERPL CALC-SCNC: 8 MMOL/L (ref 6–14)
APTT PPP: YELLOW S
AST SERPL-CCNC: 14 U/L (ref 15–37)
BACTERIA #/AREA URNS HPF: 0 /HPF
BASOPHILS # BLD AUTO: 0 X10^3/UL (ref 0–0.2)
BASOPHILS NFR BLD: 1 % (ref 0–3)
BILIRUB SERPL-MCNC: 0.4 MG/DL (ref 0.2–1)
BUN/CREAT SERPL: 21 (ref 6–20)
CA-I SERPL ISE-MCNC: 23 MG/DL (ref 7–20)
CALCIUM SERPL-MCNC: 8.7 MG/DL (ref 8.5–10.1)
CHLORIDE SERPL-SCNC: 103 MMOL/L (ref 98–107)
CO2 SERPL-SCNC: 25 MMOL/L (ref 21–32)
CREAT SERPL-MCNC: 1.1 MG/DL (ref 0.6–1)
EOSINOPHIL NFR BLD: 0.1 X10^3/UL (ref 0–0.7)
EOSINOPHIL NFR BLD: 2 % (ref 0–3)
ERYTHROCYTE [DISTWIDTH] IN BLOOD BY AUTOMATED COUNT: 13.8 % (ref 11.5–14.5)
FIBRINOGEN PPP-MCNC: CLEAR MG/DL
GFR SERPLBLD BASED ON 1.73 SQ M-ARVRAT: 49.1 ML/MIN
GLOBULIN SER-MCNC: 3.1 G/DL (ref 2.2–3.8)
GLUCOSE SERPL-MCNC: 207 MG/DL (ref 70–99)
GLUCOSE UR STRIP-MCNC: (no result) MG/DL
HCT VFR BLD CALC: 37.9 % (ref 36–47)
HGB BLD-MCNC: 12.9 G/DL (ref 12–15.5)
LYMPHOCYTES # BLD: 1.1 X10^3/UL (ref 1–4.8)
LYMPHOCYTES NFR BLD AUTO: 18 % (ref 24–48)
MCH RBC QN AUTO: 31 PG (ref 25–35)
MCHC RBC AUTO-ENTMCNC: 34 G/DL (ref 31–37)
MCV RBC AUTO: 92 FL (ref 79–100)
MONO #: 0.5 X10^3/UL (ref 0–1.1)
MONOCYTES NFR BLD: 9 % (ref 0–9)
NEUT #: 4.1 X10^3UL (ref 1.8–7.7)
NEUTROPHILS NFR BLD AUTO: 70 % (ref 31–73)
NITRITE UR QL STRIP: (no result)
PLATELET # BLD AUTO: 236 X10^3/UL (ref 140–400)
POTASSIUM SERPL-SCNC: 4 MMOL/L (ref 3.5–5.1)
PROT SERPL-MCNC: 6.6 G/DL (ref 6.4–8.2)
RBC # BLD AUTO: 4.14 X10^6/UL (ref 3.5–5.4)
RBC #/AREA URNS HPF: 0 /HPF (ref 0–2)
SODIUM SERPL-SCNC: 136 MMOL/L (ref 136–145)
SP GR UR STRIP: 1.01
SQUAMOUS #/AREA URNS LPF: (no result) /LPF
UROBILINOGEN UR-MCNC: 0.2 MG/DL
WBC # BLD AUTO: 5.8 X10^3/UL (ref 4–11)
WBC #/AREA URNS HPF: (no result) /HPF (ref 0–4)

## 2022-04-28 PROCEDURE — 93005 ELECTROCARDIOGRAM TRACING: CPT

## 2022-04-28 PROCEDURE — 80053 COMPREHEN METABOLIC PANEL: CPT

## 2022-04-28 PROCEDURE — 84484 ASSAY OF TROPONIN QUANT: CPT

## 2022-04-28 PROCEDURE — 71045 X-RAY EXAM CHEST 1 VIEW: CPT

## 2022-04-28 PROCEDURE — 99285 EMERGENCY DEPT VISIT HI MDM: CPT

## 2022-04-28 PROCEDURE — 36415 COLL VENOUS BLD VENIPUNCTURE: CPT

## 2022-04-28 PROCEDURE — 81001 URINALYSIS AUTO W/SCOPE: CPT

## 2022-04-28 PROCEDURE — 85025 COMPLETE CBC W/AUTO DIFF WBC: CPT

## 2022-04-28 NOTE — RAD
XR CHEST 1V



History: Reason: CP / Spl. Instructions:  / History: 



Comparison: February 4, 2018



Findings:

No consolidation or pleural effusion. Normal heart size. No pneumothorax.



Impression: 

1.  No acute cardiopulmonary process.



Electronically signed by: Arslan Payan DO (4/28/2022 10:46 AM) [id number]

## 2022-04-28 NOTE — PHYS DOC
Past History


Past Medical History:  Arthritis, Diabetes, Hypertension, Hypothyroid


Past Surgical History:  Other


Additional Past Surgical Histo:  Stent cardiac.


Alcohol Use:  None


Drug Use:  None





General Adult


EDM:


Chief Complaint:  CHEST PAIN





HPI:


HPI:


71-year-old female presents with chest pain.  The patient has been having pain 

very short, sharp episodes of chest pain for the last 8 days.  These seem to 

happen at random.  Sometimes she is doing activity sometimes she is sitting on 

the couch.  She states that it is there and gone in an instant.  She denies 

shortness of breath or diaphoresis.  She has no other significant symptoms.  

Today she does have some neck discomfort and a posterior headache but that has 

not been associated with chest pain.  Patient does have a history of one 

coronary stent in 2019.  She has not had a stress test since.  She denies fever 

or chills.





Review of Systems:


Review of Systems:


Constitutional:  Denies fever or chills 


Eyes:  Denies change in visual acuity 


HENT:  Denies nasal congestion or sore throat 


Respiratory:  Denies cough or shortness of breath 


Cardiovascular: Chest pain


GI:  Denies abdominal pain, nausea, vomiting, bloody stools or diarrhea 


: Denies dysuria 


Musculoskeletal:  Denies back pain or joint pain 


Integument:  Denies rash 


Neurologic:  Headache. Denies focal weakness or sensory changes 


Endocrine:  Denies polyuria or polydipsia 


Lymphatic:  Denies swollen glands 


Psychiatric:  Denies depression or anxiety





Allergies:


Allergies:





Allergies








Coded Allergies Type Severity Reaction Last Updated Verified


 


  lisinopril Allergy Intermediate  2/4/18 No











Physical Exam:


PE:





Constitutional: Well developed, well nourished, no acute distress, non-toxic 

appearance. []


HENT: Normocephalic, atraumatic, bilateral external ears normal, oropharynx 

moist, no oral exudates, nose normal. []


Eyes: PERRLA, EOMI, conjunctiva normal, no discharge. [] 


Neck: Normal range of motion, no tenderness, supple, no stridor. [] 


Cardiovascular:Heart rate regular rhythm, no murmur []


Lungs & Thorax:  Bilateral breath sounds clear to auscultation []


Abdomen: Bowel sounds normal, soft, no tenderness, no masses, no pulsatile 

masses. [] 


Skin: Warm, dry, no erythema, no rash. [] 


Back: No tenderness, no CVA tenderness. [] 


Extremities: No tenderness, no cyanosis, no clubbing, ROM intact, no edema. [] 


Neurologic: Alert and oriented X 3, normal motor function, normal sensory 

function, no focal deficits noted. []


Psychologic: Affect normal, judgement normal, mood normal. []





Current Patient Data:


Vital Signs:





                                   Vital Signs








  Date Time  Temp Pulse Resp B/P (MAP) Pulse Ox O2 Delivery O2 Flow Rate FiO2


 


4/28/22 10:32 98.2 54 16 157/63 (94) 96 Room Air  











EKG:


EKG:


Sinus rhythm, rate 53, leftward axis, no ST elevation or depression.  []





Radiology/Procedures:


Radiology/Procedures:


[]


Impressions:


XR CHEST 1V





History: Reason: CP / Spl. Instructions:  / History: 





Comparison: February 4, 2018





Findings:


No consolidation or pleural effusion. Normal heart size. No pneumothorax.





Impression: 


1.  No acute cardiopulmonary process.





Electronically signed by: Wilber Jean Baptiste DO (4/28/2022 10:46 AM) [id number]














DICTATED AND SIGNED BY:     WILBER JEAN BAPTISTE DO


DATE:     04/28/22 1045





CC: CRIS SHARPE DO; BRITTANY WONG MD ~





Heart Score:


C/O Chest Pain:  Yes


HEART Score for Chest Pain:  








HEART Score for Chest Pain Response (Comments) Value


 


History Slighlty/Non-Suspicious 0


 


ECG Normal 0


 


Age > 65 2


 


Risk Factors >3 Risk Factors or Hx CAD 2


 


Troponin < Normal Limit 0


 


Total  4








Risk Factors:


Risk Factors:  DM, Current or recent (<one month) smoker, HTN, HLP, family 

history of CAD, obesity.


Risk Scores:


Score 0 - 3:  2.5% MACE over next 6 weeks - Discharge Home


Score 4 - 6:  20.3% MACE over next 6 weeks - Admit for Clinical Observation


Score 7 - 10:  72.7% MACE over next 6 weeks - Early Invasive Strategies





Course & Med Decision Making:


Course & Med Decision Making


Pertinent Labs and Imaging studies reviewed. (See chart for details)


The patient's EKG is unremarkable.  Her chest x-ray is negative for acute 

findings.  Her labs are unremarkable.  Troponin is negative.  I am not sure what

 is causing the patient's discomfort.  It could be an irritated nerve or 

musculoskeletal.  The patient's heart score is a 4 however she does not feel 

admission is necessary.  It is only a 4 due to her previous history of a stent 

at age.  Her descriptions of the pain are reassuring.  I have informed the 

patient that if the symptoms worsen or new symptoms develop that she may need to

 be reevaluated.  She states verbal understanding.  She is stable for discharge 

at this time.


[]





Dragon Disclaimer:


Dragon Disclaimer:


This electronic medical record was generated, in whole or in part, using a voice

 recognition dictation system.





Departure


Departure:


Impression:  


   Primary Impression:  


   Chest pain


Disposition:  01 HOME / SELF CARE / HOMELESS


Condition:  STABLE


Referrals:  


BRITTANY WONG MD (PCP)


Patient Instructions:  Chest Pain (Nonspecific), Easy-to-Read











CRIS SHARPE DO                 Apr 28, 2022 10:54

## 2022-04-28 NOTE — EKG
[facility] [address], [place] [address]

Test Date:    2022               Test Time:    10:32:59

Pat Name:     JAVIER SMITH          Department:   

Patient ID:   [id number]           Room:          

Gender:       F                        Technician:   CHENTE

:          1950               Requested By: CRIS SHARPE

Order Number: [id number]            Reading MD:   Sebastian Patel

                                 Measurements

Intervals                              Axis          

Rate:         53                       P:            61

DC:           178                      QRS:          -1

QRSD:         76                       T:            73

QT:           412                                    

QTc:          389                                    

                           Interpretive Statements

SINUS RHYTHM

LEFTWARD AXIS

NON SPECIFIC ST-T WAVE CHANGES

Electronically Signed On 2022 18:13:09 CDT by Sebastian Patel